# Patient Record
Sex: MALE | Race: WHITE | NOT HISPANIC OR LATINO | Employment: FULL TIME | URBAN - METROPOLITAN AREA
[De-identification: names, ages, dates, MRNs, and addresses within clinical notes are randomized per-mention and may not be internally consistent; named-entity substitution may affect disease eponyms.]

---

## 2017-03-30 PROBLEM — E86.0 DEHYDRATION: Status: RESOLVED | Noted: 2017-03-30 | Resolved: 2017-03-30

## 2017-03-30 PROBLEM — E86.0 DEHYDRATION: Status: ACTIVE | Noted: 2017-03-30

## 2021-10-28 ENCOUNTER — TELEPHONE (OUTPATIENT)
Dept: PSYCHIATRY | Facility: CLINIC | Age: 26
End: 2021-10-28
Payer: COMMERCIAL

## 2021-10-28 PROBLEM — F98.8 ATTENTION DEFICIT DISORDER (ADD) WITHOUT HYPERACTIVITY: Status: ACTIVE | Noted: 2021-10-28

## 2021-11-16 ENCOUNTER — OFFICE VISIT (OUTPATIENT)
Dept: PSYCHIATRY | Facility: CLINIC | Age: 26
End: 2021-11-16
Payer: COMMERCIAL

## 2021-11-16 VITALS
HEIGHT: 69 IN | HEART RATE: 75 BPM | SYSTOLIC BLOOD PRESSURE: 130 MMHG | DIASTOLIC BLOOD PRESSURE: 83 MMHG | BODY MASS INDEX: 21.27 KG/M2 | WEIGHT: 143.63 LBS

## 2021-11-16 DIAGNOSIS — F90.0 ATTENTION DEFICIT HYPERACTIVITY DISORDER (ADHD), PREDOMINANTLY INATTENTIVE TYPE: ICD-10-CM

## 2021-11-16 PROCEDURE — 1159F MED LIST DOCD IN RCRD: CPT | Mod: CPTII,S$GLB,,

## 2021-11-16 PROCEDURE — 3008F PR BODY MASS INDEX (BMI) DOCUMENTED: ICD-10-PCS | Mod: CPTII,S$GLB,,

## 2021-11-16 PROCEDURE — 1160F RVW MEDS BY RX/DR IN RCRD: CPT | Mod: CPTII,S$GLB,,

## 2021-11-16 PROCEDURE — 3008F BODY MASS INDEX DOCD: CPT | Mod: CPTII,S$GLB,,

## 2021-11-16 PROCEDURE — 90792 PR PSYCHIATRIC DIAGNOSTIC EVALUATION W/MEDICAL SERVICES: ICD-10-PCS | Mod: S$GLB,,,

## 2021-11-16 PROCEDURE — 99999 PR PBB SHADOW E&M-EST. PATIENT-LVL III: ICD-10-PCS | Mod: PBBFAC,,,

## 2021-11-16 PROCEDURE — 3079F PR MOST RECENT DIASTOLIC BLOOD PRESSURE 80-89 MM HG: ICD-10-PCS | Mod: CPTII,S$GLB,,

## 2021-11-16 PROCEDURE — 1160F PR REVIEW ALL MEDS BY PRESCRIBER/CLIN PHARMACIST DOCUMENTED: ICD-10-PCS | Mod: CPTII,S$GLB,,

## 2021-11-16 PROCEDURE — 3079F DIAST BP 80-89 MM HG: CPT | Mod: CPTII,S$GLB,,

## 2021-11-16 PROCEDURE — 3075F PR MOST RECENT SYSTOLIC BLOOD PRESS GE 130-139MM HG: ICD-10-PCS | Mod: CPTII,S$GLB,,

## 2021-11-16 PROCEDURE — 3075F SYST BP GE 130 - 139MM HG: CPT | Mod: CPTII,S$GLB,,

## 2021-11-16 PROCEDURE — 90792 PSYCH DIAG EVAL W/MED SRVCS: CPT | Mod: S$GLB,,,

## 2021-11-16 PROCEDURE — 99999 PR PBB SHADOW E&M-EST. PATIENT-LVL III: CPT | Mod: PBBFAC,,,

## 2021-11-16 PROCEDURE — 1159F PR MEDICATION LIST DOCUMENTED IN MEDICAL RECORD: ICD-10-PCS | Mod: CPTII,S$GLB,,

## 2021-11-16 RX ORDER — DEXTROAMPHETAMINE SULFATE, DEXTROAMPHETAMINE SACCHARATE, AMPHETAMINE SULFATE AND AMPHETAMINE ASPARTATE 5; 5; 5; 5 MG/1; MG/1; MG/1; MG/1
20 CAPSULE, EXTENDED RELEASE ORAL DAILY
Qty: 30 CAPSULE | Refills: 0 | Status: SHIPPED | OUTPATIENT
Start: 2021-11-16 | End: 2021-12-16 | Stop reason: SDUPTHER

## 2021-12-16 ENCOUNTER — OFFICE VISIT (OUTPATIENT)
Dept: PSYCHIATRY | Facility: CLINIC | Age: 26
End: 2021-12-16
Payer: COMMERCIAL

## 2021-12-16 VITALS
HEIGHT: 69 IN | WEIGHT: 137.25 LBS | DIASTOLIC BLOOD PRESSURE: 72 MMHG | HEART RATE: 66 BPM | SYSTOLIC BLOOD PRESSURE: 129 MMHG | BODY MASS INDEX: 20.33 KG/M2

## 2021-12-16 DIAGNOSIS — F90.0 ATTENTION DEFICIT HYPERACTIVITY DISORDER (ADHD), PREDOMINANTLY INATTENTIVE TYPE: Primary | ICD-10-CM

## 2021-12-16 PROCEDURE — 99999 PR PBB SHADOW E&M-EST. PATIENT-LVL III: ICD-10-PCS | Mod: PBBFAC,,,

## 2021-12-16 PROCEDURE — 90833 PR PSYCHOTHERAPY W/PATIENT W/E&M, 30 MIN (ADD ON): ICD-10-PCS | Mod: S$GLB,,,

## 2021-12-16 PROCEDURE — 90833 PSYTX W PT W E/M 30 MIN: CPT | Mod: S$GLB,,,

## 2021-12-16 PROCEDURE — 99213 OFFICE O/P EST LOW 20 MIN: CPT | Mod: S$GLB,,,

## 2021-12-16 PROCEDURE — 99999 PR PBB SHADOW E&M-EST. PATIENT-LVL III: CPT | Mod: PBBFAC,,,

## 2021-12-16 PROCEDURE — 99213 PR OFFICE/OUTPT VISIT, EST, LEVL III, 20-29 MIN: ICD-10-PCS | Mod: S$GLB,,,

## 2021-12-16 RX ORDER — DEXTROAMPHETAMINE SACCHARATE, AMPHETAMINE ASPARTATE MONOHYDRATE, DEXTROAMPHETAMINE SULFATE AND AMPHETAMINE SULFATE 5; 5; 5; 5 MG/1; MG/1; MG/1; MG/1
20 CAPSULE, EXTENDED RELEASE ORAL EVERY MORNING
Qty: 30 CAPSULE | Refills: 0 | Status: SHIPPED | OUTPATIENT
Start: 2022-01-16 | End: 2022-02-15

## 2021-12-16 RX ORDER — DEXTROAMPHETAMINE SULFATE, DEXTROAMPHETAMINE SACCHARATE, AMPHETAMINE SULFATE AND AMPHETAMINE ASPARTATE 5; 5; 5; 5 MG/1; MG/1; MG/1; MG/1
20 CAPSULE, EXTENDED RELEASE ORAL DAILY
Qty: 30 CAPSULE | Refills: 0 | Status: SHIPPED | OUTPATIENT
Start: 2021-12-16 | End: 2022-01-15

## 2021-12-16 RX ORDER — DEXTROAMPHETAMINE SACCHARATE, AMPHETAMINE ASPARTATE MONOHYDRATE, DEXTROAMPHETAMINE SULFATE AND AMPHETAMINE SULFATE 5; 5; 5; 5 MG/1; MG/1; MG/1; MG/1
20 CAPSULE, EXTENDED RELEASE ORAL EVERY MORNING
Qty: 30 CAPSULE | Refills: 0 | Status: SHIPPED | OUTPATIENT
Start: 2022-02-16 | End: 2022-03-14 | Stop reason: SDUPTHER

## 2022-01-18 ENCOUNTER — PATIENT MESSAGE (OUTPATIENT)
Dept: PSYCHIATRY | Facility: CLINIC | Age: 27
End: 2022-01-18
Payer: COMMERCIAL

## 2022-03-14 ENCOUNTER — OFFICE VISIT (OUTPATIENT)
Dept: PSYCHIATRY | Facility: CLINIC | Age: 27
End: 2022-03-14
Payer: COMMERCIAL

## 2022-03-14 VITALS
BODY MASS INDEX: 20.36 KG/M2 | HEART RATE: 78 BPM | HEIGHT: 69 IN | DIASTOLIC BLOOD PRESSURE: 97 MMHG | WEIGHT: 137.44 LBS | SYSTOLIC BLOOD PRESSURE: 155 MMHG

## 2022-03-14 DIAGNOSIS — F90.0 ATTENTION DEFICIT HYPERACTIVITY DISORDER (ADHD), PREDOMINANTLY INATTENTIVE TYPE: Primary | ICD-10-CM

## 2022-03-14 PROCEDURE — 1159F PR MEDICATION LIST DOCUMENTED IN MEDICAL RECORD: ICD-10-PCS | Mod: CPTII,S$GLB,,

## 2022-03-14 PROCEDURE — 99999 PR PBB SHADOW E&M-EST. PATIENT-LVL III: CPT | Mod: PBBFAC,,,

## 2022-03-14 PROCEDURE — 3008F BODY MASS INDEX DOCD: CPT | Mod: CPTII,S$GLB,,

## 2022-03-14 PROCEDURE — 90833 PR PSYCHOTHERAPY W/PATIENT W/E&M, 30 MIN (ADD ON): ICD-10-PCS | Mod: S$GLB,,,

## 2022-03-14 PROCEDURE — 3008F PR BODY MASS INDEX (BMI) DOCUMENTED: ICD-10-PCS | Mod: CPTII,S$GLB,,

## 2022-03-14 PROCEDURE — 1160F RVW MEDS BY RX/DR IN RCRD: CPT | Mod: CPTII,S$GLB,,

## 2022-03-14 PROCEDURE — 3080F DIAST BP >= 90 MM HG: CPT | Mod: CPTII,S$GLB,,

## 2022-03-14 PROCEDURE — 99214 PR OFFICE/OUTPT VISIT, EST, LEVL IV, 30-39 MIN: ICD-10-PCS | Mod: S$GLB,,,

## 2022-03-14 PROCEDURE — 1160F PR REVIEW ALL MEDS BY PRESCRIBER/CLIN PHARMACIST DOCUMENTED: ICD-10-PCS | Mod: CPTII,S$GLB,,

## 2022-03-14 PROCEDURE — 3077F PR MOST RECENT SYSTOLIC BLOOD PRESSURE >= 140 MM HG: ICD-10-PCS | Mod: CPTII,S$GLB,,

## 2022-03-14 PROCEDURE — 99999 PR PBB SHADOW E&M-EST. PATIENT-LVL III: ICD-10-PCS | Mod: PBBFAC,,,

## 2022-03-14 PROCEDURE — 1159F MED LIST DOCD IN RCRD: CPT | Mod: CPTII,S$GLB,,

## 2022-03-14 PROCEDURE — 3080F PR MOST RECENT DIASTOLIC BLOOD PRESSURE >= 90 MM HG: ICD-10-PCS | Mod: CPTII,S$GLB,,

## 2022-03-14 PROCEDURE — 3077F SYST BP >= 140 MM HG: CPT | Mod: CPTII,S$GLB,,

## 2022-03-14 PROCEDURE — 90833 PSYTX W PT W E/M 30 MIN: CPT | Mod: S$GLB,,,

## 2022-03-14 PROCEDURE — 99214 OFFICE O/P EST MOD 30 MIN: CPT | Mod: S$GLB,,,

## 2022-03-14 RX ORDER — DEXTROAMPHETAMINE SACCHARATE, AMPHETAMINE ASPARTATE MONOHYDRATE, DEXTROAMPHETAMINE SULFATE AND AMPHETAMINE SULFATE 5; 5; 5; 5 MG/1; MG/1; MG/1; MG/1
20 CAPSULE, EXTENDED RELEASE ORAL EVERY MORNING
Qty: 30 CAPSULE | Refills: 0 | Status: SHIPPED | OUTPATIENT
Start: 2022-03-14 | End: 2022-04-14 | Stop reason: SDUPTHER

## 2022-03-14 NOTE — PROGRESS NOTES
Outpatient Psychiatry Follow-Up Visit (MD/NP)    3/14/2022      Clinical Status of Patient:  Outpatient (Ambulatory)    Chief Complaint:  Ion Garvey is a 27 y.o. male who presents today for follow-up of attention problems.  Met with patient.      Interval History and Content of Current Session:  Interim Events/Subjective Report/Content of Current Session:     Pt is a 27 y.o. male with past history of ADHD and depression who presents for follow up treatment. Pt established care with me on 11/16/21, where Pt met criteria for ADHD. Pt is currently taking Adderall XR 20 mg po q.a.m.  Pt reports past trials of   Med tolerated well and provides good relief of symptoms overall.     Today patient reports his symptoms continue to be controlled on current dose of Adderall.  He has continued improved ability to function at work.  However he does report an increase in psychosocial stressors recently including changes at his workplace resulting in the patient having more responsibility and less control over his schedule, working 60-65 hours per week, and starting work on a musical album.  He notes intermittent worsening in his ability to concentrate however he believes this is related to increased stressors.  He denies depressed mood or anxiety.    He does report decreased appetite, stating he sometimes forgets to eat lunch.  He states he has been more insistent on taking a daily lunch break at work recently.  His weight has not decreased today from last appointment 3 months ago. Pt denies cardiovascular events including increased heart rate or blood pressure, palpitations, chest pain, dizziness, lightheadedness, or syncopal episodes. Pt denies A/V hallucinations or behavioral effects. Pt denies anorexia, xerostomia, headache, insomnia, or digital changes.      Depressive Disorder: DENIES all.   Anxiety Disorder: none.   Manic Disorder: DENIES all.   Psychotic Disorder: DENIES all.   Substance Use:  DENIES all.  "    Initial HPI: Pt. is a 26 y.o. male, with a past psychiatric hx of ADHD presenting to the clinic for an initial evaluation and treatment. PMHx outlined below. 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 He reports that along with medication, he was provided psychoeducation and tools to cope with his symptoms. He was prescribed Adderall XR 20 mg q.a.m. which he reports helped to increase his ability to focus, organize, and follow through on tasks. The [employer] company is no longer able to provide services across state lines, however, leading the Pt to establish care today. He reports frequent careless mistakes, difficulty sustaining attention, difficulty listening when others are speaking, difficulty following through, difficulty organizing tasks and activities, frequently losing things, distractibility, forgetfulness, restlessness, difficulty waiting turn, and frequent interruption of others.     He denies depressed mood or anxiety. He reports typically he has trouble falling asleep and multiple awakenings throughout the night, however, sleep has been better since he has been exercising over the last month. Reports he is easily fatigued. Appetite has also been better since working out over the last month since.    Initial Psych ROS:  Depression: denies depressed mood, anhedonia, appetite/weight changes, insomnia/hypersomnia, fatigue, feelings of worthlessness, hopelessness, or guilt, difficulty concentrating, or recurrent thoughts of death or SI  Isaura: denies episodes of expansive mood, decreased need for sleep, increased goal directed " "behaviors, or racing thoughts  Anxiety: denies panic attacks, agoraphobia, social anxiety, phobias, excessive worry, avoidance, or somatic related complaints   OCD: denies obsessions or compulsive behaviors  PTSD: denies flashbacks, nightmares, or avoidance of stimuli  Psychosis: denies A/V hallucinations or delusions   SI/HI - denies suicidal ideation, plan, or thoughts of harm to self or others  Access to guns: owns a rifle that is kept locked    Past Psychiatric hx:   First psych contact: in 2016 for anxiety and depression, [facility]    Prior hospitalizations: denies  Prior suicide attempts or self-harm: denies  Prior diagnosis: ADHD, anxiety, and depression  Prior meds: bupropion, hydroxyzine, adderall XR 20 mg  Current meds: none, telemedicine company closed, has been off of Adderall for 2 months  Prior psychotherapy: denies    Past Medical hx:   Past Medical History:   Diagnosis Date    Depression    Hx of TBI: denies  Hx of seizures: denies    Past Surgical hx:   Past Surgical History:   Procedure Laterality Date    EYE SURGERY      TONSILLECTOMY       Family Hx:   Paternal: dad - PTSD d/t service, "[employer]";  Men on father's side of family prone to alcohol use disorder  Maternal: no psychiatric history or history of substance abuse or suicide  Siblings: brother - OCD anxiety      Social Hx:   Childhood: born in [facility]; dad left when Pt was 3, mom  again, moved frequently d/t [employer]; in [place] since sophomore year of high school  Marital Status: never   Children: none  Resides: [place]  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     Medication changes on 11/16/21:  · Start Adderall XR 20 mg po q.a.m.     Anxiety: denies  Depression: denies     Denies suicidal/homicidal ideations.  Denies hopelessness/worthlessness.    Denies auditory/visual hallucinations      Alcohol/Drugs/Caffeine/Tobacco: No change in consumption      Review of Systems   · PSYCHIATRIC: Pertinant items are noted in the narrative.  · All other systems reviewed and found to be negative       Past Medical, Family and Social History: The patient's past medical, family and social history have been reviewed and updated as appropriate within the electronic medical record - see encounter notes.    Medications: Adderall XR 20 mg po q.a.m.    Compliance: yes    Side effects: appetite loss    Risk Parameters:  Patient reports no suicidal ideation  Patient reports no homicidal ideation  Patient reports no self-injurious behavior  Patient reports no violent behavior    Exam   Constitutional  Vitals:  Most recent vital signs, dated less than 90 days prior to this appointment, were reviewed.   Last 3 sets of Vitals    Vitals - 1 value per visit 12/16/2021 3/14/2022 3/14/2022   SYSTOLIC 129 - 155   DIASTOLIC 72 - 97   Pulse 66 - 78   Temp - - -   Resp - - -   SPO2 - - -   Weight (lb) 137.24 - 137.46   Weight (kg) 62.25 - 62.35   Height 69 - 69   BMI (Calculated) 20.3 - 20.3   VISIT REPORT - - -   Pain Score  - 0 -          General:  unremarkable, age appropriate     Musculoskeletal  Muscle Strength/Tone:  no rigidity, no flaccidity, no dystonia, no tic   Gait & Station:  non-ataxic     Psychiatric  Speech:  no latency; no press   Mood & Affect:  euthymic  congruent and appropriate   Thought Process:  normal and logical   Associations:  intact   Thought Content:  normal, no suicidality, no homicidality, delusions, or paranoia   Insight:  intact   Judgement: behavior is adequate to circumstances   Orientation:  grossly intact   Memory: intact  for content of interview   Language: grossly intact   Attention Span & Concentration:  able to focus   Fund of Knowledge:  intact and appropriate to age and level of education     Suicide Risk Assessment:  Protective factors: age, gender, no prior attempts, no prior hospitalizations, no ongoing substance abuse, no psychosis, denies SI/intent/plan, seeking treatment, access to treatment, future oriented, good primary support  Risks: hx anxiety and depression, gender, access to firearms  Patient is a low immediate and long-term risk considering risk factors. Patient denied suicidal or homicidal ideation, plan, or intent.  Patient noted agreement to call 911 and/or present to the nearest emergency department if Pt develops suicidal or homicidal ideation, plan, or intent.      Assessment and Diagnosis   Status/Progress: Based on the examination today, the patient's problem(s) is/are well controlled.  New problems have not been presented today.   Co-morbidities are not complicating management of the primary condition.  There are no active rule-out diagnoses for this patient at this time.     General Impression: Pt is a 27 y.o. male with past history of ADHD. Symptoms continue to be controlled on Adderall XR 20 mg po q.a.m. however the patient reports some intermittent difficulty with concentration and attention which may be related to psychosocial stressors.  Denies chest pain, palpitations, shortness of breath, syncope, dizziness, A/V hallucinations, diaphoresis. Pt does report decreased appetite, however he states this SE is tolerable and he would like to continue the medication at the current dose. Again discussed R/B/SE of stimulants including but not limited to cardiovascular events including increased heart rate and blood pressure, acute myocardial infarction, new-onset psychosis or rasheeda, AH/VH, serotonin syndrome, H/A, insomnia, xerostomia, and anorexia. Pt verbalized understanding.with Pt who verbalized  understanding and states he wishes to continue treatment.    This patient's profile was checked on the [place] Prescription Monitoring Program. No evidence of misuse.    Safe for outpatient follow up and no acute safety concerns.    Encounter Diagnosis   Name Primary?    Attention deficit hyperactivity disorder (ADHD), predominantly inattentive type Yes         Intervention/Counseling/Treatment Plan   · Medication Management: The risks and benefits of medication were discussed with the patient. Shared decision making occurred   · The treatment plan and follow up plan were reviewed with the patient.   · Continue Adderall XR 20 mg po q.a.m.   · Offered referral for individual psychotherapy.  · Counseled on regular exercise, maintenance of a healthy weight, balanced diet rich in fruits/vegetables and lean protein, and avoidance of unhealthy habits like smoking and excessive alcohol intake.  · Call to report any worsening of symptoms or problems with the medication. Pt instructed to go to ER with thoughts of harming self, others  · Labs: no new orders    Attention deficit hyperactivity disorder (ADHD), predominantly inattentive type  -     dextroamphetamine-amphetamine (ADDERALL XR) 20 MG 24 hr capsule; Take 1 capsule (20 mg total) by mouth every morning.  Dispense: 30 capsule; Refill: 0        Psychotherapy:    Target symptoms: distractability, lack of focus   Outcome monitoring methods: self-report, observation, feedback from family   Therapeutic Intervention Type: supportive psychotherapy   Why chosen therapy is appropriate versus another modality: relevant to diagnosis, patient responds to this modality, evidence based practice   Patient's response to intervention:The patient's response to intervention is accepting.   Progress toward goals: The patient's progress toward goals is good.   Topics discussed/themes: building skills sets for symptom management, symptom recognition, nutrition,  exercise   Duration of intervention: 16 minutes    Return to Clinic: 1 month, as needed    -Pt given contact number for psychotherapists at [facility] and also instructed they may check with their health insurance provider for a list of providers  -Spent 30 minutes face to face with the Pt; >50% time spent in counseling   -Questions were sought and answered to the Pt's stated verbal satisfaction.  -Supportive therapy and psychoeducation provided.  -Risks, benefits, and side effects of medications discussed with the Pt who expresses understanding and chooses to take medications as prescribed.   -Pt instructed to call clinic, 911, or go to nearest emergency room if symptoms worsen or pt is in crisis. The Pt expresses understanding.    DISCLAIMER: This note was prepared with [employer] Direct voice recognition transcription software. Garbled syntax, mangled pronouns, and other bizarre constructions may be attributed to that software system     HOLLY Bledsoe, PMHNP-BC  Department of Psychiatry - [facility] [address]  [place] [address]  Office: [phone]  Fax: [phone]

## 2022-04-13 ENCOUNTER — PATIENT MESSAGE (OUTPATIENT)
Dept: PSYCHIATRY | Facility: CLINIC | Age: 27
End: 2022-04-13
Payer: COMMERCIAL

## 2022-04-14 ENCOUNTER — OFFICE VISIT (OUTPATIENT)
Dept: PSYCHIATRY | Facility: CLINIC | Age: 27
End: 2022-04-14
Payer: COMMERCIAL

## 2022-04-14 VITALS
SYSTOLIC BLOOD PRESSURE: 133 MMHG | WEIGHT: 134.38 LBS | HEART RATE: 62 BPM | BODY MASS INDEX: 19.9 KG/M2 | HEIGHT: 69 IN | DIASTOLIC BLOOD PRESSURE: 84 MMHG

## 2022-04-14 DIAGNOSIS — F90.0 ATTENTION DEFICIT HYPERACTIVITY DISORDER (ADHD), PREDOMINANTLY INATTENTIVE TYPE: Primary | ICD-10-CM

## 2022-04-14 PROCEDURE — 99214 OFFICE O/P EST MOD 30 MIN: CPT | Mod: S$GLB,,,

## 2022-04-14 PROCEDURE — 3075F SYST BP GE 130 - 139MM HG: CPT | Mod: CPTII,S$GLB,,

## 2022-04-14 PROCEDURE — 90833 PR PSYCHOTHERAPY W/PATIENT W/E&M, 30 MIN (ADD ON): ICD-10-PCS | Mod: S$GLB,,,

## 2022-04-14 PROCEDURE — 3075F PR MOST RECENT SYSTOLIC BLOOD PRESS GE 130-139MM HG: ICD-10-PCS | Mod: CPTII,S$GLB,,

## 2022-04-14 PROCEDURE — 1159F PR MEDICATION LIST DOCUMENTED IN MEDICAL RECORD: ICD-10-PCS | Mod: CPTII,S$GLB,,

## 2022-04-14 PROCEDURE — 3079F PR MOST RECENT DIASTOLIC BLOOD PRESSURE 80-89 MM HG: ICD-10-PCS | Mod: CPTII,S$GLB,,

## 2022-04-14 PROCEDURE — 90833 PSYTX W PT W E/M 30 MIN: CPT | Mod: S$GLB,,,

## 2022-04-14 PROCEDURE — 99999 PR PBB SHADOW E&M-EST. PATIENT-LVL III: ICD-10-PCS | Mod: PBBFAC,,,

## 2022-04-14 PROCEDURE — 3008F PR BODY MASS INDEX (BMI) DOCUMENTED: ICD-10-PCS | Mod: CPTII,S$GLB,,

## 2022-04-14 PROCEDURE — 99999 PR PBB SHADOW E&M-EST. PATIENT-LVL III: CPT | Mod: PBBFAC,,,

## 2022-04-14 PROCEDURE — 3008F BODY MASS INDEX DOCD: CPT | Mod: CPTII,S$GLB,,

## 2022-04-14 PROCEDURE — 1160F PR REVIEW ALL MEDS BY PRESCRIBER/CLIN PHARMACIST DOCUMENTED: ICD-10-PCS | Mod: CPTII,S$GLB,,

## 2022-04-14 PROCEDURE — 99214 PR OFFICE/OUTPT VISIT, EST, LEVL IV, 30-39 MIN: ICD-10-PCS | Mod: S$GLB,,,

## 2022-04-14 PROCEDURE — 1160F RVW MEDS BY RX/DR IN RCRD: CPT | Mod: CPTII,S$GLB,,

## 2022-04-14 PROCEDURE — 1159F MED LIST DOCD IN RCRD: CPT | Mod: CPTII,S$GLB,,

## 2022-04-14 PROCEDURE — 3079F DIAST BP 80-89 MM HG: CPT | Mod: CPTII,S$GLB,,

## 2022-04-14 RX ORDER — DEXTROAMPHETAMINE SACCHARATE, AMPHETAMINE ASPARTATE MONOHYDRATE, DEXTROAMPHETAMINE SULFATE AND AMPHETAMINE SULFATE 5; 5; 5; 5 MG/1; MG/1; MG/1; MG/1
40 CAPSULE, EXTENDED RELEASE ORAL EVERY MORNING
Qty: 60 CAPSULE | Refills: 0 | Status: SHIPPED | OUTPATIENT
Start: 2022-05-14 | End: 2022-06-14 | Stop reason: SDUPTHER

## 2022-04-14 RX ORDER — DEXTROAMPHETAMINE SACCHARATE, AMPHETAMINE ASPARTATE MONOHYDRATE, DEXTROAMPHETAMINE SULFATE AND AMPHETAMINE SULFATE 5; 5; 5; 5 MG/1; MG/1; MG/1; MG/1
40 CAPSULE, EXTENDED RELEASE ORAL EVERY MORNING
Qty: 60 CAPSULE | Refills: 0 | Status: SHIPPED | OUTPATIENT
Start: 2022-04-14 | End: 2022-05-14

## 2022-04-14 NOTE — PROGRESS NOTES
Outpatient Psychiatry Follow-Up Visit (MD/NP)    4/14/2022      Clinical Status of Patient:  Outpatient (Ambulatory)    Chief Complaint:  Ion Garvey is a 27 y.o. male who presents today for follow-up of attention problems.  Met with patient.      Interval History and Content of Current Session:  Interim Events/Subjective Report/Content of Current Session:     Pt is a 27 y.o. male with past history of ADHD and depression who presents for follow up treatment. Pt established care with me on 11/16/21, where Pt met criteria for ADHD. Pt is currently taking Adderall XR 20 mg po q.a.m.  Pt reports past trials of   Med tolerated well and provides good relief of symptoms overall.     Today patient reports his symptoms are nit well controlled on current dose of Adderall.  He reports he has been more distracted and has been leaving things unfinished.  However he does report an increase in psychosocial stressors recently including changes at his workplace resulting in the patient having more responsibility and less control over his schedule, working 60-65 hours per week, and starting work on a musical album.  He does report he took 2 Adderall 20 mg for a total of 40 mg and felt that this does controlled his symptoms allow him to focus and complete projects at work.  He denies any increase in side effects with the 40 mg dose.  He denies depressed mood or anxiety.    He does continue to report decreased appetite but states that he has been taking lunch break every day at 11:00 a.m.  His weight has not decreased today from last appointment. Pt denies cardiovascular events including increased heart rate or blood pressure, palpitations, chest pain, dizziness, lightheadedness, or syncopal episodes. Pt denies A/V hallucinations or behavioral effects. Pt denies anorexia, xerostomia, headache, insomnia, or digital changes.      Depressive Disorder: DENIES all.   Anxiety Disorder: none.   Manic Disorder: DENIES all.   Psychotic  "Disorder: DENIES all.   Substance Use:  DENIES all.     Initial HPI: Pt. is a 26 y.o. male, with a past psychiatric hx of ADHD presenting to the clinic for an initial evaluation and treatment. PMHx outlined below. 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 He reports that along with medication, he was provided psychoeducation and tools to cope with his symptoms. He was prescribed Adderall XR 20 mg q.a.m. which he reports helped to increase his ability to focus, organize, and follow through on tasks. The telemedicine company is no longer able to provide services across state lines, however, leading the Pt to establish care today. He reports frequent careless mistakes, difficulty sustaining attention, difficulty listening when others are speaking, difficulty following through, difficulty organizing tasks and activities, frequently losing things, distractibility, forgetfulness, restlessness, difficulty waiting turn, and frequent interruption of others.     He denies depressed mood or anxiety. He reports typically he has trouble falling asleep and multiple awakenings throughout the night, however, sleep has been better since he has been exercising over the last month. Reports he is easily fatigued. Appetite has also been better since working out over the last month since.    Initial Psych ROS:  Depression: denies depressed mood, anhedonia, appetite/weight changes, insomnia/hypersomnia, fatigue, feelings of worthlessness, hopelessness, or guilt, difficulty concentrating, or recurrent thoughts of death or SI  Isaura: denies episodes of expansive mood, " "decreased need for sleep, increased goal directed behaviors, or racing thoughts  Anxiety: denies panic attacks, agoraphobia, social anxiety, phobias, excessive worry, avoidance, or somatic related complaints   OCD: denies obsessions or compulsive behaviors  PTSD: denies flashbacks, nightmares, or avoidance of stimuli  Psychosis: denies A/V hallucinations or delusions   SI/HI - denies suicidal ideation, plan, or thoughts of harm to self or others  Access to guns: owns a rifle that is kept locked    Past Psychiatric hx:   First psych contact: in 2016 for anxiety and depression, [facility]    Prior hospitalizations: denies  Prior suicide attempts or self-harm: denies  Prior diagnosis: ADHD, anxiety, and depression  Prior meds: bupropion, hydroxyzine, adderall XR 20 mg  Current meds: none, telemedicine company closed, has been off of Adderall for 2 months  Prior psychotherapy: denies    Past Medical hx:   Past Medical History:   Diagnosis Date    Depression    Hx of TBI: denies  Hx of seizures: denies    Past Surgical hx:   Past Surgical History:   Procedure Laterality Date    EYE SURGERY      TONSILLECTOMY       Family Hx:   Paternal: dad - PTSD d/t service, "herm[employer]";  Men on father's side of family prone to alcohol use disorder  Maternal: no psychiatric history or history of substance abuse or suicide  Siblings: brother - OCD anxiety      Social Hx:   Childhood: born in [facility]; dad left when Pt was 3, mom  again, moved frequently d/t ; in [place] since sophomore year of high school  Marital Status: never   Children: none  Resides: [place]  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     Medication changes on 11/16/21:  · Start Adderall XR 20 mg po q.a.m.     Anxiety: denies  Depression: denies     Alcohol/Drugs/Caffeine/Tobacco: No change in consumption    Review of Systems   · PSYCHIATRIC: Pertinant items are noted in the narrative.  · All other systems reviewed and found to be negative       Past Medical, Family and Social History: The patient's past medical, family and social history have been reviewed and updated as appropriate within the electronic medical record - see encounter notes.    Medications: Adderall XR 20 mg po q.a.m.    Compliance: yes    Side effects: appetite loss    Risk Parameters:  Patient reports no suicidal ideation  Patient reports no homicidal ideation  Patient reports no self-injurious behavior  Patient reports no violent behavior    Exam   Constitutional  Vitals:  Most recent vital signs, dated less than 90 days prior to this appointment, were reviewed.   Last 3 sets of Vitals    Vitals - 1 value per visit 3/14/2022 4/14/2022 4/14/2022   SYSTOLIC 155 - 133   DIASTOLIC 97 - 84   Pulse 78 - 62   Temp - - -   Resp - - -   SPO2 - - -   Weight (lb) 137.46 - 134.37   Weight (kg) 62.35 - 60.95   Height 69 - 69   BMI (Calculated) 20.3 - 19.8   VISIT REPORT - - -   Pain Score  - 0 -          General:  unremarkable, age appropriate     Musculoskeletal  Muscle Strength/Tone:  no rigidity, no flaccidity, no dystonia, no tic   Gait & Station:  non-ataxic     Psychiatric  Speech:  no latency; no press   Mood & Affect:  euthymic  congruent and appropriate   Thought Process:  normal and logical   Associations:  intact   Thought Content:  normal, no suicidality, no homicidality, delusions, or paranoia   Insight:  intact   Judgement: behavior is adequate to circumstances   Orientation:  grossly intact   Memory: intact for content of interview   Language: grossly intact   Attention Span &  Concentration:  able to focus   Fund of Knowledge:  intact and appropriate to age and level of education     Suicide Risk Assessment:  Protective factors: age, gender, no prior attempts, no prior hospitalizations, no ongoing substance abuse, no psychosis, denies SI/intent/plan, seeking treatment, access to treatment, future oriented, good primary support  Risks: hx anxiety and depression, gender, access to firearms  Patient is a low immediate and long-term risk considering risk factors. Patient denied suicidal or homicidal ideation, plan, or intent.  Patient noted agreement to call 911 and/or present to the nearest emergency department if Pt develops suicidal or homicidal ideation, plan, or intent.      Assessment and Diagnosis   Status/Progress: Based on the examination today, the patient's problem(s) is/are well controlled.  New problems have not been presented today.   Co-morbidities are not complicating management of the primary condition.  There are no active rule-out diagnoses for this patient at this time.     General Impression: Pt is a 27 y.o. male with past history of ADHD.  Patient reports the 20 mg dose of Adderall is no longer controlling his symptoms of attention and concentration deficit.  He does note that he tried taking 40 mg of Adderall and reports this does provide a good control of his symptoms but did not increase side effects.     Denies chest pain, palpitations, shortness of breath, syncope, dizziness, A/V hallucinations, diaphoresis. Pt does report decreased appetite, however he states this SE is tolerable and he would like to continue the medication at the current dose. Again discussed R/B/SE of stimulants including but not limited to cardiovascular events including increased heart rate and blood pressure, acute myocardial infarction, new-onset psychosis or rasheeda, AH/VH, serotonin syndrome, H/A, insomnia, xerostomia, and anorexia. Pt verbalized understanding.with Pt who verbalized  understanding and states he wishes to continue treatment.    This patient's profile was checked on the [place] Prescription Monitoring Program. No evidence of misuse.    Safe for outpatient follow up and no acute safety concerns.    Encounter Diagnosis   Name Primary?    Attention deficit hyperactivity disorder (ADHD), predominantly inattentive type Yes         Intervention/Counseling/Treatment Plan   · Medication Management: The risks and benefits of medication were discussed with the patient. Shared decision making occurred   · The treatment plan and follow up plan were reviewed with the patient.   · Increase Adderall XR to 40 mg po q.a.m.   · Schedule a visit with primary care provider for elevated blood pressure  · Follow-up with Offered referral for individual psychotherapy.  · Counseled on regular exercise, maintenance of a healthy weight, balanced diet rich in fruits/vegetables and lean protein, and avoidance of unhealthy habits like smoking and excessive alcohol intake.  · Call to report any worsening of symptoms or problems with the medication. Pt instructed to go to ER with thoughts of harming self, others  · Labs: no new orders    Attention deficit hyperactivity disorder (ADHD), predominantly inattentive type  -     dextroamphetamine-amphetamine (ADDERALL XR) 20 MG 24 hr capsule; Take 2 capsules (40 mg total) by mouth every morning.  Dispense: 60 capsule; Refill: 0    Other orders  -     dextroamphetamine-amphetamine (ADDERALL XR) 20 MG 24 hr capsule; Take 2 capsules (40 mg total) by mouth every morning.  Dispense: 60 capsule; Refill: 0        Psychotherapy:    Target symptoms: distractability, lack of focus   Outcome monitoring methods: self-report, observation, feedback from family   Therapeutic Intervention Type: supportive psychotherapy   Why chosen therapy is appropriate versus another modality: relevant to diagnosis, patient responds to this modality, evidence based practice   Patient's  response to intervention:The patient's response to intervention is accepting.   Progress toward goals: The patient's progress toward goals is good.   Topics discussed/themes: building skills sets for symptom management, symptom recognition, nutrition, exercise   Duration of intervention: 16 minutes    Return to Clinic: 2 months, as needed    -Pt given contact number for psychotherapists at [facility] and also instructed they may check with their health insurance provider for a list of providers  -Spent 30 minutes face to face with the Pt; >50% time spent in counseling   -Questions were sought and answered to the Pt's stated verbal satisfaction.  -Supportive therapy and psychoeducation provided.  -Risks, benefits, and side effects of medications discussed with the Pt who expresses understanding and chooses to take medications as prescribed.   -Pt instructed to call clinic, 911, or go to nearest emergency room if symptoms worsen or pt is in crisis. The Pt expresses understanding.    DISCLAIMER: This note was prepared with [employer] Direct voice recognition transcription software. Garbled syntax, mangled pronouns, and other bizarre constructions may be attributed to that software system     HOLLY Bledsoe, PMHNP-BC  Department of Psychiatry - [facility]  [address]  [place] [address]  Office: [phone]  Fax: [phone]

## 2022-06-13 ENCOUNTER — PATIENT MESSAGE (OUTPATIENT)
Dept: PSYCHIATRY | Facility: CLINIC | Age: 27
End: 2022-06-13
Payer: COMMERCIAL

## 2022-06-14 ENCOUNTER — OFFICE VISIT (OUTPATIENT)
Dept: PSYCHIATRY | Facility: CLINIC | Age: 27
End: 2022-06-14
Payer: COMMERCIAL

## 2022-06-14 VITALS
RESPIRATION RATE: 20 BRPM | BODY MASS INDEX: 19.58 KG/M2 | HEART RATE: 71 BPM | SYSTOLIC BLOOD PRESSURE: 133 MMHG | DIASTOLIC BLOOD PRESSURE: 71 MMHG | WEIGHT: 132.63 LBS

## 2022-06-14 DIAGNOSIS — F90.0 ATTENTION DEFICIT HYPERACTIVITY DISORDER (ADHD), PREDOMINANTLY INATTENTIVE TYPE: Primary | ICD-10-CM

## 2022-06-14 PROCEDURE — 1159F PR MEDICATION LIST DOCUMENTED IN MEDICAL RECORD: ICD-10-PCS | Mod: CPTII,S$GLB,,

## 2022-06-14 PROCEDURE — 3008F PR BODY MASS INDEX (BMI) DOCUMENTED: ICD-10-PCS | Mod: CPTII,S$GLB,,

## 2022-06-14 PROCEDURE — 3078F DIAST BP <80 MM HG: CPT | Mod: CPTII,S$GLB,,

## 2022-06-14 PROCEDURE — 3078F PR MOST RECENT DIASTOLIC BLOOD PRESSURE < 80 MM HG: ICD-10-PCS | Mod: CPTII,S$GLB,,

## 2022-06-14 PROCEDURE — 3075F SYST BP GE 130 - 139MM HG: CPT | Mod: CPTII,S$GLB,,

## 2022-06-14 PROCEDURE — 3075F PR MOST RECENT SYSTOLIC BLOOD PRESS GE 130-139MM HG: ICD-10-PCS | Mod: CPTII,S$GLB,,

## 2022-06-14 PROCEDURE — 1160F PR REVIEW ALL MEDS BY PRESCRIBER/CLIN PHARMACIST DOCUMENTED: ICD-10-PCS | Mod: CPTII,S$GLB,,

## 2022-06-14 PROCEDURE — 99999 PR PBB SHADOW E&M-EST. PATIENT-LVL III: ICD-10-PCS | Mod: PBBFAC,,,

## 2022-06-14 PROCEDURE — 3008F BODY MASS INDEX DOCD: CPT | Mod: CPTII,S$GLB,,

## 2022-06-14 PROCEDURE — 99213 OFFICE O/P EST LOW 20 MIN: CPT | Mod: S$GLB,,,

## 2022-06-14 PROCEDURE — 99999 PR PBB SHADOW E&M-EST. PATIENT-LVL III: CPT | Mod: PBBFAC,,,

## 2022-06-14 PROCEDURE — 1160F RVW MEDS BY RX/DR IN RCRD: CPT | Mod: CPTII,S$GLB,,

## 2022-06-14 PROCEDURE — 1159F MED LIST DOCD IN RCRD: CPT | Mod: CPTII,S$GLB,,

## 2022-06-14 PROCEDURE — 99213 PR OFFICE/OUTPT VISIT, EST, LEVL III, 20-29 MIN: ICD-10-PCS | Mod: S$GLB,,,

## 2022-06-14 RX ORDER — DEXTROAMPHETAMINE SACCHARATE, AMPHETAMINE ASPARTATE MONOHYDRATE, DEXTROAMPHETAMINE SULFATE AND AMPHETAMINE SULFATE 5; 5; 5; 5 MG/1; MG/1; MG/1; MG/1
40 CAPSULE, EXTENDED RELEASE ORAL EVERY MORNING
Qty: 60 CAPSULE | Refills: 0 | Status: SHIPPED | OUTPATIENT
Start: 2022-07-14 | End: 2022-08-13

## 2022-06-14 RX ORDER — DEXTROAMPHETAMINE SACCHARATE, AMPHETAMINE ASPARTATE MONOHYDRATE, DEXTROAMPHETAMINE SULFATE AND AMPHETAMINE SULFATE 5; 5; 5; 5 MG/1; MG/1; MG/1; MG/1
40 CAPSULE, EXTENDED RELEASE ORAL EVERY MORNING
Qty: 60 CAPSULE | Refills: 0 | Status: SHIPPED | OUTPATIENT
Start: 2022-06-14 | End: 2022-07-14

## 2022-06-14 RX ORDER — DEXTROAMPHETAMINE SACCHARATE, AMPHETAMINE ASPARTATE MONOHYDRATE, DEXTROAMPHETAMINE SULFATE AND AMPHETAMINE SULFATE 5; 5; 5; 5 MG/1; MG/1; MG/1; MG/1
40 CAPSULE, EXTENDED RELEASE ORAL EVERY MORNING
Qty: 60 CAPSULE | Refills: 0 | Status: SHIPPED | OUTPATIENT
Start: 2022-08-14 | End: 2022-11-28 | Stop reason: SDUPTHER

## 2022-06-14 NOTE — PROGRESS NOTES
Outpatient Psychiatry Follow-Up Visit (MD/NP)    6/14/2022      Clinical Status of Patient:  Outpatient (Ambulatory)    Chief Complaint:  Ion Garvey is a 27 y.o. male who presents today for follow-up of attention problems.  Met with patient.      Interval History and Content of Current Session:  Interim Events/Subjective Report/Content of Current Session:     Pt is a 27 y.o. male with past history of ADHD and depression who presents for follow up treatment. Pt established care with me on 11/16/21, where Pt met criteria for ADHD. Pt is currently taking Adderall XR 40 mg po q.a.m.  Pt reports past trials of   Med tolerated well and provides good relief of symptoms overall.     In the interim, patient reports good control of symptoms of ADHD with increase in dose of Adderall XR.  He notes it has been helpful to be able to tailor his dose to the demands of the day.  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  He has reports he has made an appointment with his primary care provider to discuss his elevated blood pressure.    Patient reports decreased appetite continues to represent a problem and notes a weight loss of several lb recently.  He states he is going to speak with his supervisor about scheduling a daily lunch break so that he has time to eat.  He denies any other side effects of Adderall. Pt denies cardiovascular events including increased heart rate or blood pressure, palpitations, chest pain, dizziness, lightheadedness, or syncopal episodes. Pt denies A/V hallucinations or behavioral effects. Pt denies anorexia, xerostomia, headache, insomnia, or digital changes.      Depressive Disorder: DENIES all.   Anxiety Disorder: none.   Manic Disorder: DENIES all.   Psychotic Disorder:  "DENIES all.   Substance Use:  DENIES all.     Initial HPI: Pt. is a 26 y.o. male, with a past psychiatric hx of ADHD presenting to the clinic for an initial evaluation and treatment. PMHx outlined below. 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 He reports that along with medication, he was provided psychoeducation and tools to cope with his symptoms. He was prescribed Adderall XR 20 mg q.a.m. which he reports helped to increase his ability to focus, organize, and follow through on tasks. The telemedicine company is no longer able to provide services across state lines, however, leading the Pt to establish care today. He reports frequent careless mistakes, difficulty sustaining attention, difficulty listening when others are speaking, difficulty following through, difficulty organizing tasks and activities, frequently losing things, distractibility, forgetfulness, restlessness, difficulty waiting turn, and frequent interruption of others.     He denies depressed mood or anxiety. He reports typically he has trouble falling asleep and multiple awakenings throughout the night, however, sleep has been better since he has been exercising over the last month. Reports he is easily fatigued. Appetite has also been better since working out over the last month since.    Initial Psych ROS:  Depression: denies depressed mood, anhedonia, appetite/weight changes, insomnia/hypersomnia, fatigue, feelings of worthlessness, hopelessness, or guilt, difficulty concentrating, or recurrent thoughts of death or SI  Isaura: denies episodes of expansive mood, decreased need " "for sleep, increased goal directed behaviors, or racing thoughts  Anxiety: denies panic attacks, agoraphobia, social anxiety, phobias, excessive worry, avoidance, or somatic related complaints   OCD: denies obsessions or compulsive behaviors  PTSD: denies flashbacks, nightmares, or avoidance of stimuli  Psychosis: denies A/V hallucinations or delusions   SI/HI - denies suicidal ideation, plan, or thoughts of harm to self or others  Access to guns: owns a rifle that is kept locked    Past Psychiatric hx:   First psych contact: in 2016 for anxiety and depression, [facility]    Prior hospitalizations: denies  Prior suicide attempts or self-harm: denies  Prior diagnosis: ADHD, anxiety, and depression  Prior meds: bupropion, hydroxyzine, adderall XR 20 mg  Current meds: none, telemedicine company closed, has been off of Adderall for 2 months  Prior psychotherapy: denies    Past Medical hx:   Past Medical History:   Diagnosis Date    Depression    Hx of TBI: denies  Hx of seizures: denies    Past Surgical hx:   Past Surgical History:   Procedure Laterality Date    EYE SURGERY      TONSILLECTOMY       Family Hx:   Paternal: dad - PTSD d/t service, "hermTopio";  Men on father's side of family prone to alcohol use disorder  Maternal: no psychiatric history or history of substance abuse or suicide  Siblings: brother - OCD anxiety      Social Hx:   Childhood: born in [facility]; dad left when Pt was 3, mom  again, moved frequently d/t ; in [place] since sophomore year of high school  Marital Status: never   Children: none  Resides: [place]  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     3/14/22:  · Continue Adderall XR 20 mg po q.a.m.     Medication changes on 11/16/21:  · Start Adderall XR 20 mg po q.a.m.     Anxiety: denies  Depression: denies     Alcohol/Drugs/Caffeine/Tobacco: No change in consumption    Review of Systems   · PSYCHIATRIC: Pertinant items are noted in the narrative.  · All other systems reviewed and found to be negative       Past Medical, Family and Social History: The patient's past medical, family and social history have been reviewed and updated as appropriate within the electronic medical record - see encounter notes.    Medications: Adderall XR 40 mg po q.a.m.    Compliance: yes    Side effects: appetite loss    Risk Parameters:  Patient reports no suicidal ideation  Patient reports no homicidal ideation  Patient reports no self-injurious behavior  Patient reports no violent behavior    Exam   Constitutional  Vitals:  Most recent vital signs, dated less than 90 days prior to this appointment, were reviewed.   Last 3 sets of Vitals    Vitals - 1 value per visit 4/14/2022 6/14/2022 6/14/2022   SYSTOLIC 133 - 133   DIASTOLIC 84 - 71   Pulse 62 - 71   Temp - - -   Resp - - 20   SPO2 - - -   Weight (lb) 134.37 - 132.61   Weight (kg) 60.95 - 60.15   Height 69 - -   BMI (Calculated) 19.8 - -   VISIT REPORT - - -   Pain Score  - 0 -          General:  unremarkable, age appropriate     Musculoskeletal  Muscle Strength/Tone:  no rigidity, no flaccidity, no dystonia, no tic   Gait & Station:  non-ataxic     Psychiatric  Speech:  no latency; no press   Mood & Affect:  euthymic  congruent and appropriate   Thought Process:  normal and logical   Associations:  intact   Thought Content:  normal, no suicidality, no homicidality, delusions, or paranoia   Insight:  intact   Judgement: behavior is adequate to circumstances   Orientation:  grossly intact   Memory: intact for content of interview    Language: grossly intact   Attention Span & Concentration:  able to focus   Fund of Knowledge:  intact and appropriate to age and level of education     Suicide Risk Assessment:  Protective factors: age, gender, no prior attempts, no prior hospitalizations, no ongoing substance abuse, no psychosis, denies SI/intent/plan, seeking treatment, access to treatment, future oriented, good primary support  Risks: hx anxiety and depression, gender, access to firearms  Patient is a low immediate and long-term risk considering risk factors. Patient denied suicidal or homicidal ideation, plan, or intent.  Patient noted agreement to call 911 and/or present to the nearest emergency department if Pt develops suicidal or homicidal ideation, plan, or intent.      Assessment and Diagnosis   Status/Progress: Based on the examination today, the patient's problem(s) is/are well controlled.  New problems have not been presented today.   Co-morbidities are not complicating management of the primary condition.  There are no active rule-out diagnoses for this patient at this time.     General Impression: Pt is a 27 y.o. male with past history of ADHD.  Patient reports good control of symptoms of ADHD with Adderall XR 40 mg p.o. q.a.m..  He denies side effects.    Again discussed R/B/SE of stimulants including but not limited to cardiovascular events including increased heart rate and blood pressure, acute myocardial infarction, new-onset psychosis or rasheeda, AH/VH, serotonin syndrome, H/A, insomnia, xerostomia, and anorexia. Pt verbalized understanding.with Pt who verbalized understanding and states he wishes to continue treatment.    This patient's profile was checked on the [place] Prescription Monitoring Program. No evidence of misuse.    Safe for outpatient follow up and no acute safety concerns.    No diagnosis found.      Intervention/Counseling/Treatment Plan   · Medication Management: The risks and benefits of medication were  discussed with the patient. Shared decision making occurred   · The treatment plan and follow up plan were reviewed with the patient.   · Continue Adderall XR 40 mg po q.a.m.   · Schedule a visit with primary care provider for elevated blood pressure  · Offered referral for individual psychotherapy.  · Counseled on regular exercise, maintenance of a healthy weight, balanced diet rich in fruits/vegetables and lean protein, and avoidance of unhealthy habits like smoking and excessive alcohol intake.  · Call to report any worsening of symptoms or problems with the medication. Pt instructed to go to ER with thoughts of harming self, others  · Labs: no new orders    There are no diagnoses linked to this encounter.    Psychotherapy:    Target symptoms: distractability, lack of focus   Outcome monitoring methods: self-report, observation, feedback from family   Therapeutic Intervention Type: supportive psychotherapy   Why chosen therapy is appropriate versus another modality: relevant to diagnosis, patient responds to this modality, evidence based practice   Patient's response to intervention:The patient's response to intervention is accepting.   Progress toward goals: The patient's progress toward goals is good.   Topics discussed/themes: building skills sets for symptom management, symptom recognition, nutrition, exercise   Duration of intervention: 16 minutes    Return to Clinic: 3 months        Medication Management: The risks and benefits of stimulant medication were discussed.      Patient has no contraindications: no h/o allergic rxn, agitation, arrythmia, cardiovascular disease, cardiac structural abnormalities, hyperthyroidism, glaucoma, etc.    Completed cardiac screen prior to initiation.    Discussed stimulant side effects including effects on sleep, appetite, cardiac concerns, chest pain, psychosis, rasheeda, aggression, HTN, MI, stroke, arrythmia, seizure, anaphylaxis or other allergic reactions,  leukopenia, nervousness, anorexia, insomnia, tachycardia, palpitations, dizziness, BP changes, HR changes, visual disturbance, and headaches   Discussed medication being a controlled substance, to place medication in a secured place, and the risk of dependency. Discussed to never use this medication in combination with illicit drugs, alcohol, or sedatives.    Patient to sign consent for treatment with a controlled substance document,.   Discussed diagnosis, risks and benefits of proposed treatment vs alternative treatments vs no treatment, and potential side effects of these treatments (death, dependency, etc.). The patient expresses understanding of the above and displays the capacity to agree with this treatment given said understanding. Patient also agrees that, currently, the benefits outweigh the risks and would like to pursue treatment at this time.    -Educated patient about appropriate treatment options incl. stimulant medication, nonstimulant medication, and therapy.   -Educated patient about appropriate use of ADHD medications, common side effects of the medications (cardiac being most significant) and when to call about complications.   -Take any c/o chest pain seriously & seek immediate medical attention.        -Pt given contact number for psychotherapists at [facility] and also instructed they may check with their health insurance provider for a list of providers  -Spent 30 minutes face to face with the Pt; >50% time spent in counseling   -Questions were sought and answered to the Pt's stated verbal satisfaction.  -Supportive therapy and psychoeducation provided.  -Risks, benefits, and side effects of medications discussed with the Pt who expresses understanding and chooses to take medications as prescribed.   -Pt instructed to call clinic, 911, or go to nearest emergency room if symptoms worsen or pt is in crisis. The Pt expresses understanding.    DISCLAIMER: This note was prepared with [employer]  Modal [employer] Direct voice recognition transcription software. Garbled syntax, mangled pronouns, and other bizarre constructions may be attributed to that software system     HOLLY Bledsoe, PMHNP-BC  Department of Psychiatry - [facility] [address] [facility]  [place] [address]  Office: [phone]  Fax: [phone]

## 2022-11-09 RX ORDER — DEXTROAMPHETAMINE SACCHARATE, AMPHETAMINE ASPARTATE MONOHYDRATE, DEXTROAMPHETAMINE SULFATE AND AMPHETAMINE SULFATE 5; 5; 5; 5 MG/1; MG/1; MG/1; MG/1
40 CAPSULE, EXTENDED RELEASE ORAL EVERY MORNING
Qty: 60 CAPSULE | Refills: 0 | OUTPATIENT
Start: 2022-11-09 | End: 2022-12-09

## 2022-11-09 NOTE — TELEPHONE ENCOUNTER
PATIENT CALLED   Patient called and scheduled a follow up appointment.   Patient apologized for not scheduling a visit sooner that he had some financial issues and couldn't afford visits or medication at the time.     Patient is scheduled for 11/28/22 at 2pm virtually.   But is asking if there was anyway he can get a refill on his medication before then     He would like medication to be sent to the [facility] [address] [place], [place]

## 2022-11-11 RX ORDER — DEXTROAMPHETAMINE SACCHARATE, AMPHETAMINE ASPARTATE MONOHYDRATE, DEXTROAMPHETAMINE SULFATE AND AMPHETAMINE SULFATE 5; 5; 5; 5 MG/1; MG/1; MG/1; MG/1
40 CAPSULE, EXTENDED RELEASE ORAL EVERY MORNING
Qty: 60 CAPSULE | Refills: 0 | OUTPATIENT
Start: 2022-11-11 | End: 2022-12-11

## 2022-11-25 ENCOUNTER — PATIENT MESSAGE (OUTPATIENT)
Dept: PSYCHIATRY | Facility: CLINIC | Age: 27
End: 2022-11-25
Payer: COMMERCIAL

## 2022-11-28 ENCOUNTER — OFFICE VISIT (OUTPATIENT)
Dept: PSYCHIATRY | Facility: CLINIC | Age: 27
End: 2022-11-28
Payer: COMMERCIAL

## 2022-11-28 ENCOUNTER — PATIENT MESSAGE (OUTPATIENT)
Dept: PSYCHIATRY | Facility: CLINIC | Age: 27
End: 2022-11-28
Payer: COMMERCIAL

## 2022-11-28 DIAGNOSIS — F90.0 ATTENTION DEFICIT HYPERACTIVITY DISORDER (ADHD), PREDOMINANTLY INATTENTIVE TYPE: Primary | ICD-10-CM

## 2022-11-28 PROCEDURE — 99212 OFFICE O/P EST SF 10 MIN: CPT | Mod: PO

## 2022-11-28 PROCEDURE — 1159F PR MEDICATION LIST DOCUMENTED IN MEDICAL RECORD: ICD-10-PCS | Mod: CPTII,95,,

## 2022-11-28 PROCEDURE — 1159F MED LIST DOCD IN RCRD: CPT | Mod: CPTII,95,,

## 2022-11-28 PROCEDURE — 1160F RVW MEDS BY RX/DR IN RCRD: CPT | Mod: CPTII,95,,

## 2022-11-28 PROCEDURE — 99213 PR OFFICE/OUTPT VISIT, EST, LEVL III, 20-29 MIN: ICD-10-PCS | Mod: 95,,,

## 2022-11-28 PROCEDURE — 1160F PR REVIEW ALL MEDS BY PRESCRIBER/CLIN PHARMACIST DOCUMENTED: ICD-10-PCS | Mod: CPTII,95,,

## 2022-11-28 PROCEDURE — 99213 OFFICE O/P EST LOW 20 MIN: CPT | Mod: 95,,,

## 2022-11-28 RX ORDER — DEXTROAMPHETAMINE SACCHARATE, AMPHETAMINE ASPARTATE MONOHYDRATE, DEXTROAMPHETAMINE SULFATE AND AMPHETAMINE SULFATE 5; 5; 5; 5 MG/1; MG/1; MG/1; MG/1
40 CAPSULE, EXTENDED RELEASE ORAL EVERY MORNING
Qty: 60 CAPSULE | Refills: 0 | Status: SHIPPED | OUTPATIENT
Start: 2023-01-28 | End: 2023-03-21 | Stop reason: SDUPTHER

## 2022-11-28 RX ORDER — DEXTROAMPHETAMINE SACCHARATE, AMPHETAMINE ASPARTATE MONOHYDRATE, DEXTROAMPHETAMINE SULFATE AND AMPHETAMINE SULFATE 5; 5; 5; 5 MG/1; MG/1; MG/1; MG/1
40 CAPSULE, EXTENDED RELEASE ORAL EVERY MORNING
Qty: 60 CAPSULE | Refills: 0 | Status: SHIPPED | OUTPATIENT
Start: 2022-12-28 | End: 2023-01-27

## 2022-11-28 RX ORDER — DEXTROAMPHETAMINE SACCHARATE, AMPHETAMINE ASPARTATE MONOHYDRATE, DEXTROAMPHETAMINE SULFATE AND AMPHETAMINE SULFATE 5; 5; 5; 5 MG/1; MG/1; MG/1; MG/1
40 CAPSULE, EXTENDED RELEASE ORAL EVERY MORNING
Qty: 60 CAPSULE | Refills: 0 | Status: SHIPPED | OUTPATIENT
Start: 2022-11-28 | End: 2022-12-28

## 2022-11-28 NOTE — PROGRESS NOTES
Outpatient Psychiatry Follow-Up Visit (MD/NP)    11/28/2022      Clinical Status of Patient:  Outpatient (Virtual)  The patient location is: [address] [place] [address]  The patient phone number is: [phone]   Visit type: Virtual visit with synchronous audio and video  Each patient to whom he or she provides medical services by telemedicine is:  (1) informed of the relationship between the practitioner and patient and the respective role of any other health care provider with respect to management of the patient; and (2) notified that he or she may decline to receive medical services by telemedicine and may withdraw from such care at any time.      Chief Complaint:  Ion Garvey is a 27 y.o. male who presents today for follow-up of attention problems.  Met with patient.      Interval History and Content of Current Session:  Interim Events/Subjective Report/Content of Current Session:     Pt is a 27 y.o. male with past history of ADHD and depression who presents for follow up treatment. Pt established care with me on 11/16/21, where Pt met criteria for ADHD. Pt is currently taking Adderall XR 40 mg po q.a.m.  Pt reports past trials of bupropion, hydroxyzine.  Med tolerated well and provides good relief of symptoms overall.      Over the interim patient was unable to schedule a follow-up appointment due to financial issues.  Last visit was 6/14/2022.  Last Adderall fill date was 9/4/2022.  Patient has been without Adderall for approximately 2 months.  He reports during this time he was able to go back to the gym and gain some weight which is significant given that Adderall has caused decreased appetite in the past.  Patient states he noticed a difference in productivity at work noting he experienced increased difficulty in getting things done at work.  He reports an increase in the length of time it took to do regular tasks.  He also reports colleagues commented on his change in ability to  function work.    Patient notes who is unable to follow up with his primary care doctor for his elevated blood pressure over the interim but notes he will call his primary care to make an appointment today.    Pt denies cardiovascular events including increased heart rate or increased blood pressure, palpitations, chest pain, dizziness, lightheadedness, or syncopal episodes. Pt denies A/V hallucinations or behavioral effects. Pt denies anorexia, xerostomia, headache, insomnia, or digital changes.  Patient plans to schedule meals in order to avoid losing weight.      6/14/2022: In the interim, patient reports good control of symptoms of ADHD with increase in dose of Adderall XR.  He notes it has been helpful to be able to tailor his dose to the demands of the day.  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  He has reports he has made an appointment with his primary care provider to discuss his elevated blood pressure.    Patient reports decreased appetite continues to represent a problem and notes a weight loss of several lb recently.  He states he is going to speak with his supervisor about scheduling a daily lunch break so that he has time to eat.  He denies any other side effects of Adderall. Pt denies cardiovascular events including increased heart rate or blood pressure, palpitations, chest pain, dizziness, lightheadedness, or syncopal episodes. Pt denies A/V hallucinations or behavioral effects. Pt denies anorexia, xerostomia, headache, insomnia, or digital changes.      Initial HPI: Pt. is a 26 y.o. male, with a past psychiatric hx of ADHD presenting to the clinic for an initial evaluation and treatment. PMHx outlined below. 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 He reports that along with medication, he was provided psychoeducation and tools to cope with his symptoms. He was prescribed Adderall XR 20 mg q.a.m. which he reports helped to increase his ability to focus, organize, and follow through on tasks. The telemedicine company is no longer able to provide services across state lines, however, leading the Pt to establish care today. He reports frequent careless mistakes, difficulty sustaining attention, difficulty listening when others are speaking, difficulty following through, difficulty organizing tasks and activities, frequently losing things, distractibility, forgetfulness, restlessness, difficulty waiting turn, and frequent interruption of others.     He denies depressed mood or anxiety. He reports typically he has trouble falling asleep and multiple awakenings throughout the night, however, sleep has been better since he has been exercising over the last month. Reports he is easily fatigued. Appetite has also been better since working out over the last month since.    Initial Psych ROS:  Depression: denies depressed mood, anhedonia, appetite/weight changes, insomnia/hypersomnia, fatigue, feelings of worthlessness, hopelessness, or guilt, difficulty concentrating, or recurrent thoughts of death or SI  Isaura: denies episodes of expansive mood, decreased need for sleep, increased goal directed behaviors, or racing thoughts  Anxiety: denies panic attacks, agoraphobia, social anxiety, phobias, excessive worry, avoidance, or somatic related complaints   OCD: denies obsessions or compulsive behaviors  PTSD: denies " "flashbacks, nightmares, or avoidance of stimuli  Psychosis: denies A/V hallucinations or delusions   SI/HI - denies suicidal ideation, plan, or thoughts of harm to self or others  Access to guns: owns a rifle that is kept locked    Past Psychiatric hx:   First psych contact: in 2016 for anxiety and depression, [facility]    Prior hospitalizations: denies  Prior suicide attempts or self-harm: denies  Prior diagnosis: ADHD, anxiety, and depression  Prior meds: bupropion, hydroxyzine, adderall XR 20 mg  Current meds: none, telemedicine company closed, has been off of Adderall for 2 months  Prior psychotherapy: denies    Past Medical hx:   Past Medical History:   Diagnosis Date    Depression    Hx of TBI: denies  Hx of seizures: denies    Family Hx:   Paternal: dad - PTSD d/t service, "hermit";  Men on father's side of family prone to alcohol use disorder  Maternal: no psychiatric history or history of substance abuse or suicide  Siblings: brother - OCD anxiety      Social Hx:   Childhood: born in [facility]; dad left when Pt was 3, mom  again, moved frequently d/t ; in [place] since sophomore year of high school  Marital Status: never   Children: none  Resides: [place]  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     4/14/22  Increase Adderall XR to 40 mg po q.a.m.     3/14/22:  Continue Adderall XR 20 mg po q.a.m.     Medication changes on 11/16/21:  Start Adderall XR 20 mg po q.a.m.     Anxiety: denies  Depression: denies     Alcohol/Drugs/Caffeine/Tobacco: No change in " consumption    Review of Systems   PSYCHIATRIC: Pertinant items are noted in the narrative.  All other systems reviewed and found to be negative       Past Medical, Family and Social History: The patient's past medical, family and social history have been reviewed and updated as appropriate within the electronic medical record - see encounter notes.    Adherence: yes    Side effects: see above    Risk Parameters:  Patient reports no suicidal ideation  Patient reports no homicidal ideation  Patient reports no self-injurious behavior  Patient reports no violent behavior    Exam   Constitutional  Vitals:  Most recent vital signs, dated less than 90 days prior to this appointment, were reviewed.   Last 3 sets of Vitals    Vitals - 1 value per visit 4/14/2022 6/14/2022 6/14/2022   SYSTOLIC 133 - 133   DIASTOLIC 84 - 71   Pulse 62 - 71   Temp - - -   Resp - - 20   SPO2 - - -   Weight (lb) 134.37 - 132.61   Weight (kg) 60.95 - 60.15   Height 69 - -   BMI (Calculated) 19.8 - -   VISIT REPORT - - -   Pain Score  - 0 -          General:  unremarkable, age appropriate     Musculoskeletal  Muscle Strength/Tone:  Unable to assess due to nature of virtual visit   Gait & Station:  Unable to assess due to nature of virtual visit     Psychiatric  Speech:  no latency; no press   Mood & Affect:  euthymic  congruent and appropriate   Thought Process:  normal and logical   Associations:  intact   Thought Content:  normal, no suicidality, no homicidality, delusions, or paranoia   Insight:  intact   Judgement: behavior is adequate to circumstances   Orientation:  grossly intact   Memory: intact for content of interview   Language: grossly intact   Attention Span & Concentration:  able to focus   Fund of Knowledge:  intact and appropriate to age and level of education     Suicide Risk Assessment:  Protective factors: age, gender, no prior attempts, no prior hospitalizations, no ongoing substance abuse, no psychosis, denies SI/intent/plan,  seeking treatment, access to treatment, future oriented, good primary support  Risks: hx anxiety and depression, gender, access to firearms  Patient is a low immediate and long-term risk considering risk factors. Patient denied suicidal or homicidal ideation, plan, or intent.  Patient noted agreement to call 911 and/or present to the nearest emergency department if Pt develops suicidal or homicidal ideation, plan, or intent.      Assessment and Diagnosis   Status/Progress: Based on the examination today, the patient's problem(s) is/are well controlled.  New problems have not been presented today.   Co-morbidities are not complicating management of the primary condition.  There are no active rule-out diagnoses for this patient at this time.     General Impression: Pt is a 27 y.o. male with past history of ADHD.  Patient has been out of medication for approximately 2 months.  Through shared decision making, Adderall XR 40 mg p.o. q.a.m. will be restarted at this time. Discussed R/B/SE of stimulants including but not limited to cardiovascular events including increased heart rate and blood pressure, acute myocardial infarction, new-onset psychosis or rasheeda, AH/VH, serotonin syndrome, H/A, insomnia, xerostomia, and anorexia. Pt verbalized understanding.with Pt who verbalized understanding and states he wishes to proceed with treatment.  Patient agrees to contact this clinician should he develop side effects or worsening of symptoms.    This patient's profile was checked on the [place] Prescription Monitoring Program. No evidence of misuse.    Safe for outpatient follow up and no acute safety concerns.    Encounter Diagnosis   Name Primary?    Attention deficit hyperactivity disorder (ADHD), predominantly inattentive type Yes       Intervention/Counseling/Treatment Plan   Medication Management: The risks and benefits of medication were discussed with the patient. Shared decision making occurred   The treatment plan and  follow up plan were reviewed with the patient.   Restart Adderall XR 40 mg po q.a.m.   Schedule a visit with primary care provider for elevated blood pressure  Offered referral for individual psychotherapy.  Counseled on regular exercise, maintenance of a healthy weight, balanced diet rich in fruits/vegetables and lean protein, and avoidance of unhealthy habits like smoking and excessive alcohol intake.  Call to report any worsening of symptoms or problems with the medication. Pt instructed to go to ER with thoughts of harming self, others  Labs: no new orders    Attention deficit hyperactivity disorder (ADHD), predominantly inattentive type  -     dextroamphetamine-amphetamine (ADDERALL XR) 20 MG 24 hr capsule; Take 2 capsules (40 mg total) by mouth every morning.  Dispense: 60 capsule; Refill: 0  -     dextroamphetamine-amphetamine (ADDERALL XR) 20 MG 24 hr capsule; Take 2 capsules (40 mg total) by mouth every morning.  Dispense: 60 capsule; Refill: 0  -     dextroamphetamine-amphetamine (ADDERALL XR) 20 MG 24 hr capsule; Take 2 capsules (40 mg total) by mouth every morning.  Dispense: 60 capsule; Refill: 0      Psychotherapy:   Target symptoms: distractability, lack of focus  Outcome monitoring methods: self-report, observation, feedback from family  Therapeutic Intervention Type: supportive psychotherapy  Why chosen therapy is appropriate versus another modality: relevant to diagnosis, patient responds to this modality, evidence based practice  Patient's response to intervention:The patient's response to intervention is accepting.  Progress toward goals: The patient's progress toward goals is good.  Topics discussed/themes: building skills sets for symptom management, symptom recognition, nutrition, exercise  Duration of intervention: 10 minutes    Return to Clinic: 3 months        Medication Management: The risks and benefits of stimulant medication were discussed.     Patient has no contraindications: no h/o  allergic rxn, agitation, arrythmia, cardiovascular disease, cardiac structural abnormalities, hyperthyroidism, glaucoma, etc.   Completed cardiac screen prior to initiation.   Discussed stimulant side effects including effects on sleep, appetite, cardiac concerns, chest pain, psychosis, rasheeda, aggression, HTN, MI, stroke, arrythmia, seizure, anaphylaxis or other allergic reactions, leukopenia, nervousness, anorexia, insomnia, tachycardia, palpitations, dizziness, BP changes, HR changes, visual disturbance, and headaches  Discussed medication being a controlled substance, to place medication in a secured place, and the risk of dependency. Discussed to never use this medication in combination with illicit drugs, alcohol, or sedatives.   Patient to sign consent for treatment with a controlled substance document,.  Discussed diagnosis, risks and benefits of proposed treatment vs alternative treatments vs no treatment, and potential side effects of these treatments (death, dependency, etc.). The patient expresses understanding of the above and displays the capacity to agree with this treatment given said understanding. Patient also agrees that, currently, the benefits outweigh the risks and would like to pursue treatment at this time.    -Educated patient about appropriate treatment options incl. stimulant medication, nonstimulant medication, and therapy.   -Educated patient about appropriate use of ADHD medications, common side effects of the medications (cardiac being most significant) and when to call about complications.   -Take any c/o chest pain seriously & seek immediate medical attention.        -Pt given contact number for psychotherapists at [facility] and also instructed they may check with their health insurance provider for a list of providers  -Spent 30 minutes face to face with the Pt; >50% time spent in counseling   -Questions were sought and answered to the Pt's stated verbal  satisfaction.  -Supportive therapy and psychoeducation provided.  -Risks, benefits, and side effects of medications discussed with the Pt who expresses understanding and chooses to take medications as prescribed.   -Pt instructed to call clinic, 911, or go to nearest emergency room if symptoms worsen or pt is in crisis. The Pt expresses understanding.    DISCLAIMER: This note was prepared with [employer] voice recognition transcription software. Garbled syntax, mangled pronouns, and other bizarre constructions may be attributed to that software system     HOLLY Bledsoe, PMHNP-BC  Department of Psychiatry - [facility] [address]  [place] [address]  Office: [phone]  Fax: [phone]

## 2023-02-27 ENCOUNTER — PATIENT MESSAGE (OUTPATIENT)
Dept: PSYCHIATRY | Facility: CLINIC | Age: 28
End: 2023-02-27
Payer: COMMERCIAL

## 2023-02-28 ENCOUNTER — OFFICE VISIT (OUTPATIENT)
Dept: PSYCHIATRY | Facility: CLINIC | Age: 28
End: 2023-02-28
Payer: COMMERCIAL

## 2023-02-28 DIAGNOSIS — F90.0 ATTENTION DEFICIT HYPERACTIVITY DISORDER (ADHD), PREDOMINANTLY INATTENTIVE TYPE: Primary | ICD-10-CM

## 2023-02-28 PROCEDURE — 99214 PR OFFICE/OUTPT VISIT, EST, LEVL IV, 30-39 MIN: ICD-10-PCS | Mod: 95,,,

## 2023-02-28 PROCEDURE — 4010F PR ACE/ARB THEARPY RXD/TAKEN: ICD-10-PCS | Mod: CPTII,95,,

## 2023-02-28 PROCEDURE — 4010F ACE/ARB THERAPY RXD/TAKEN: CPT | Mod: CPTII,95,,

## 2023-02-28 PROCEDURE — 1160F PR REVIEW ALL MEDS BY PRESCRIBER/CLIN PHARMACIST DOCUMENTED: ICD-10-PCS | Mod: CPTII,95,,

## 2023-02-28 PROCEDURE — 1159F MED LIST DOCD IN RCRD: CPT | Mod: CPTII,95,,

## 2023-02-28 PROCEDURE — 1159F PR MEDICATION LIST DOCUMENTED IN MEDICAL RECORD: ICD-10-PCS | Mod: CPTII,95,,

## 2023-02-28 PROCEDURE — 99214 OFFICE O/P EST MOD 30 MIN: CPT | Mod: 95,,,

## 2023-02-28 PROCEDURE — 1160F RVW MEDS BY RX/DR IN RCRD: CPT | Mod: CPTII,95,,

## 2023-02-28 RX ORDER — LISDEXAMFETAMINE DIMESYLATE 50 MG/1
50 CAPSULE ORAL EVERY MORNING
Qty: 30 CAPSULE | Refills: 0 | Status: SHIPPED | OUTPATIENT
Start: 2023-02-28 | End: 2023-03-21

## 2023-02-28 NOTE — PROGRESS NOTES
Outpatient Psychiatry Follow-Up Visit (MD/NP)    2/28/2023      Clinical Status of Patient:  Outpatient (Virtual)  The patient location is: [address] [place] [address]  The patient phone number is: [phone]   Visit type: Virtual visit with synchronous audio and video  Each patient to whom he or she provides medical services by telemedicine is:  (1) informed of the relationship between the practitioner and patient and the respective role of any other health care provider with respect to management of the patient; and (2) notified that he or she may decline to receive medical services by telemedicine and may withdraw from such care at any time.      Chief Complaint:  Ion Garvey is a 28 y.o. male who presents today for follow-up. Met with patient.        LAST VISIT SYNOPSIS AND INTERVAL NOTES  Last seen 11/28/2022    General Impression: Pt is a 27 y.o. male with past history of ADHD.  Patient has been out of medication for approximately 2 months.  Through shared decision making, Adderall XR 40 mg p.o. q.a.m. will be restarted at this time. Discussed R/B/SE of stimulants including but not limited to cardiovascular events including increased heart rate and blood pressure, acute myocardial infarction, new-onset psychosis or rasheeda, AH/VH, serotonin syndrome, H/A, insomnia, xerostomia, and anorexia. Pt verbalized understanding.with Pt who verbalized understanding and states he wishes to proceed with treatment.  Patient agrees to contact this clinician should he develop side effects or worsening of symptoms. This patient's profile was checked on the [place] Prescription Monitoring Program. No evidence of misuse. Safe for outpatient follow up and no acute safety concerns.          Encounter Diagnosis   Name Primary?    Attention deficit hyperactivity disorder (ADHD), predominantly inattentive type      Plan:  Restart Adderall XR 40 mg po q.a.m.   Schedule a visit with primary care provider for  elevated blood pressure  Offered referral for individual psychotherapy      PSYCHOTROPIC MEDICATION HISTORY (Highest Dose)  bupropion, hydroxyzine      TODAY ON INTERVIEW  Interim Events/Subjective Report/Content of Current Session:     Patient reports he did follow up with his PCP regarding his blood pressure and has been started on Benicar which has controlled his hypertension    Patient has been unable to obtain Adderall for the last several weeks due to the current nationwide stimulant shortage.  He reports inattentive as well as hyperactive symptoms were well controlled.  He reports decreased appetite and xerostomia but denies other side effects of Adderall.    Patient denies SI/HI, significant sleep concerns, or medication adverse effects.  No interval episodes with symptoms consistent with rasheeda or hypomania.  Denied interval or current suicidal/homicidal thoughts, intent, or plan or NSSI.  Denied other questions and concerns.       11/28/2022:  Over the interim patient was unable to schedule a follow-up appointment due to financial issues.  Last visit was 6/14/2022.  Last Adderall fill date was 9/4/2022.  Patient has been without Adderall for approximately 2 months.  He reports during this time he was able to go back to the gym and gain some weight which is significant given that Adderall has caused decreased appetite in the past.  Patient states he noticed a difference in productivity at work noting he experienced increased difficulty in getting things done at work.  He reports an increase in the length of time it took to do regular tasks.  He also reports colleagues commented on his change in ability to function work.    Patient notes who is unable to follow up with his primary care doctor for his elevated blood pressure over the interim but notes he will call his primary care to make an appointment today.    Pt denies cardiovascular events including increased heart rate or increased blood pressure,  "palpitations, chest pain, dizziness, lightheadedness, or syncopal episodes. Pt denies A/V hallucinations or behavioral effects. Pt denies anorexia, xerostomia, headache, insomnia, or digital changes.  Patient plans to schedule meals in order to avoid losing weight.      Initial HPI: Anette is a 26 y.o. male, with a past psychiatric hx of ADHD presenting to the clinic for an initial evaluation and treatment. PMHx outlined below. 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 He reports that along with medication, he was provided psychoeducation and tools to cope with his symptoms. He was prescribed Adderall XR 20 mg q.a.m. which he reports helped to increase his ability to focus, organize, and follow through on tasks. The [employer] company is no longer able to provide services across state lines, however, leading the Pt to establish care today. He reports frequent careless mistakes, difficulty sustaining attention, difficulty listening when others are speaking, difficulty following through, difficulty organizing tasks and activities, frequently losing things, distractibility, forgetfulness, restlessness, difficulty waiting turn, and frequent interruption of others.     He denies depressed mood or anxiety. He reports typically he has trouble falling asleep and multiple awakenings throughout the night, however, sleep has been better since he has been exercising over the last month. Reports he is easily fatigued. Appetite has also been better since working out over the last month since.      Past Psychiatric hx:   First psych contact: in 2016 for anxiety and " "depression, [facility]    Prior hospitalizations: denies  Prior suicide attempts or self-harm: denies  Prior diagnosis: ADHD, anxiety, and depression  Prior meds: bupropion, hydroxyzine, adderall XR 20 mg  Current meds: none, telemedicine company closed, has been off of Adderall for 2 months  Prior psychotherapy: denies    Past Medical hx:   Past Medical History:   Diagnosis Date    Depression    Hx of TBI: denies  Hx of seizures: denies    Family Hx:   Paternal: dad - PTSD d/t service, "hermit";  Men on father's side of family prone to alcohol use disorder  Maternal: no psychiatric history or history of substance abuse or suicide  Siblings: brother - OCD anxiety      Social Hx:   Childhood: born in 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  All other systems reviewed and found to be negative       Interim hx:      Alcohol/Drugs/Caffeine/Tobacco: No change in consumption    Past Medical, Family and Social History: The patient's past medical, family and social history have been reviewed and updated as appropriate within the electronic medical record - see encounter notes.    Adherence: yes    Side effects: see above    Risk Parameters:  Patient reports no suicidal ideation  Patient reports no homicidal ideation  Patient reports no self-injurious behavior  Patient reports no violent " behavior    Exam   Constitutional  Vitals:  Most recent vital signs, dated less than 90 days prior to this appointment, were reviewed.   Last 3 sets of Vitals    Vitals - 1 value per visit 12/20/2022 12/20/2022 1/20/2023   SYSTOLIC 142 140 116   DIASTOLIC 88 90 78   Pulse 80 - 80   Temp 97.9 - 97.5   Resp 16 - 14   SPO2 100 - 95   Weight (lb) 140 - 141.2   Weight (kg) 63.504 - 64.048   Height 69 - 69   BMI (Calculated) 20.7 - 20.8   VISIT REPORT - - -   Pain Score  - - -          General:  unremarkable, age appropriate     Musculoskeletal  Muscle Strength/Tone:  No tremors appreciated   Gait & Station:  Unable to assess, Pt seated during virtual visit     Psychiatric  Speech:  no latency; no press   Mood & Affect:  euthymic  congruent and appropriate   Thought Process:  normal and logical   Associations:  intact   Thought Content:  normal, no suicidality, no homicidality, delusions, or paranoia   Insight:  intact   Judgement: behavior is adequate to circumstances   Orientation:  grossly intact   Memory: intact for content of interview   Language: grossly intact   Attention Span & Concentration:  able to focus   Fund of Knowledge:  intact and appropriate to age and level of education     Suicide Risk Assessment:  Protective factors: age, gender, no prior attempts, no prior hospitalizations, no ongoing substance abuse, no psychosis, denies SI/intent/plan, seeking treatment, access to treatment, future oriented, good primary support  Risks: hx anxiety and depression, gender, access to firearms  Patient is a low immediate and long-term risk considering risk factors.     Assessment and Diagnosis   Status/Progress: Based on the examination today, the patient's problem(s) is/are well controlled.  New problems have not been presented today.   Co-morbidities are not complicating management of the primary condition.  There are no active rule-out diagnoses for this patient at this time.     General Impression: Pt is a 28 y.o.  male with past history of ADHD who presents today for follow-up treatment.      The patient reports well controlled symptoms and denies any current or interval psychiatric complaints. The patient appears stable, and MSE remains unremarkable. Patient is not in immediate risk of harm to self or others and is not considered at risk of deterioration. Patient instructed to contact the clinic if symptoms worsen or if questions concerning treatment plan arise. No changes in management. Plan as follows:        Patient has been out of medication for approximately 2 months.  Through shared decision making, Adderall XR 40 mg p.o. q.a.m. will be restarted at this time. Discussed R/B/SE of stimulants including but not limited to cardiovascular events including increased heart rate and blood pressure, acute myocardial infarction, new-onset psychosis or rasheeda, AH/VH, serotonin syndrome, H/A, insomnia, xerostomia, and anorexia. Pt verbalized understanding.with Pt who verbalized understanding and states he wishes to proceed with treatment.  Patient agrees to contact this clinician should he develop side effects or worsening of symptoms.    This patient's profile was checked on the [place] Prescription Monitoring Program. No evidence of misuse.    Safe for outpatient follow up and no acute safety concerns.    Encounter Diagnosis   Name Primary?    Attention deficit hyperactivity disorder (ADHD), predominantly inattentive type Yes         Intervention/Counseling/Treatment Plan   Medication Management: The risks and benefits of medication were discussed with the patient. Shared decision making occurred   The treatment plan and follow up plan were reviewed with the patient.     Discontinue Adderall XR due to unavailability  Start Vyvanse 50 mg q.a.m. for ADHD  Offered referral for individual psychotherapy.    Counseled on regular exercise, maintenance of a healthy weight, balanced diet rich in fruits/vegetables and lean protein, and  avoidance of unhealthy habits like smoking and excessive alcohol intake.  Call to report any worsening of symptoms or problems with the medication. Pt instructed to go to ER with thoughts of harming self, others  Labs: no new orders      Psychotherapy:   Target symptoms: distractability, lack of focus  Outcome monitoring methods: self-report, observation, feedback from family  Therapeutic Intervention Type: supportive psychotherapy  Why chosen therapy is appropriate versus another modality: relevant to diagnosis, patient responds to this modality, evidence based practice  Patient's response to intervention:The patient's response to intervention is accepting.  Progress toward goals: The patient's progress toward goals is good.  Topics discussed/themes: building skills sets for symptom management, symptom recognition, nutrition, exercise  Duration of intervention: 10 minutes    Return to Clinic: 3 months        Medication Management: The risks and benefits of stimulant medication were discussed.     Patient has no contraindications: no h/o allergic rxn, agitation, arrythmia, cardiovascular disease, cardiac structural abnormalities, hyperthyroidism, glaucoma, etc.   Completed cardiac screen prior to initiation.   Discussed stimulant side effects including effects on sleep, appetite, cardiac concerns, chest pain, psychosis, rasheeda, aggression, HTN, MI, stroke, arrythmia, seizure, anaphylaxis or other allergic reactions, leukopenia, nervousness, anorexia, insomnia, tachycardia, palpitations, dizziness, BP changes, HR changes, visual disturbance, and headaches  Discussed medication being a controlled substance, to place medication in a secured place, and the risk of dependency. Discussed to never use this medication in combination with illicit drugs, alcohol, or sedatives.   Patient to sign consent for treatment with a controlled substance document,.  Discussed diagnosis, risks and benefits of proposed treatment vs  alternative treatments vs no treatment, and potential side effects of these treatments (death, dependency, etc.). The patient expresses understanding of the above and displays the capacity to agree with this treatment given said understanding. Patient also agrees that, currently, the benefits outweigh the risks and would like to pursue treatment at this time.    -Educated patient about appropriate treatment options incl. stimulant medication, nonstimulant medication, and therapy.   -Educated patient about appropriate use of ADHD medications, common side effects of the medications (cardiac being most significant) and when to call about complications.   -Take any c/o chest pain seriously & seek immediate medical attention.        30 minutes of total time spent on the encounter, which includes face to face time and non-face to face time preparing to see the patient (eg. review of tests), obtaining and/or reviewing separately obtained history, documenting clinical information in the electronic health record, independently interpreting results (not separately reported), and communicating results to the patient/family/caregiver, or care coordination (not separately reported).       -Questions were sought and answered to the Pt's stated verbal satisfaction.  -Supportive therapy and psychoeducation provided.  -Risks, benefits, and side effects of medications discussed with the Pt who expresses understanding and chooses to take medications as prescribed.   -Pt instructed to call clinic, 911, or go to nearest emergency room if symptoms worsen or pt is in crisis. The Pt expresses understanding.    DISCLAIMER: This note was prepared with [employer] voice recognition transcription software. Garbled syntax, mangled pronouns, and other bizarre constructions may be attributed to that software system     HOLLY Bledsoe, [id number]  Department of Psychiatry - [facility]  [address] [facility] [facility]  Approach  [place] [id number]  Office: [phone]  Fax: [phone]

## 2023-03-21 DIAGNOSIS — F90.0 ATTENTION DEFICIT HYPERACTIVITY DISORDER (ADHD), PREDOMINANTLY INATTENTIVE TYPE: ICD-10-CM

## 2023-03-21 RX ORDER — DEXTROAMPHETAMINE SACCHARATE, AMPHETAMINE ASPARTATE MONOHYDRATE, DEXTROAMPHETAMINE SULFATE AND AMPHETAMINE SULFATE 5; 5; 5; 5 MG/1; MG/1; MG/1; MG/1
40 CAPSULE, EXTENDED RELEASE ORAL EVERY MORNING
Qty: 60 CAPSULE | Refills: 0 | Status: SHIPPED | OUTPATIENT
Start: 2023-03-21 | End: 2023-08-10 | Stop reason: SDUPTHER

## 2023-08-10 ENCOUNTER — OFFICE VISIT (OUTPATIENT)
Dept: PSYCHIATRY | Facility: CLINIC | Age: 28
End: 2023-08-10
Payer: COMMERCIAL

## 2023-08-10 VITALS — SYSTOLIC BLOOD PRESSURE: 130 MMHG | DIASTOLIC BLOOD PRESSURE: 87 MMHG | HEART RATE: 76 BPM

## 2023-08-10 DIAGNOSIS — F90.0 ATTENTION DEFICIT HYPERACTIVITY DISORDER (ADHD), PREDOMINANTLY INATTENTIVE TYPE: ICD-10-CM

## 2023-08-10 PROCEDURE — 4010F PR ACE/ARB THEARPY RXD/TAKEN: ICD-10-PCS | Mod: CPTII,S$GLB,,

## 2023-08-10 PROCEDURE — 99999 PR PBB SHADOW E&M-EST. PATIENT-LVL II: CPT | Mod: PBBFAC,,,

## 2023-08-10 PROCEDURE — 99214 PR OFFICE/OUTPT VISIT, EST, LEVL IV, 30-39 MIN: ICD-10-PCS | Mod: S$GLB,,,

## 2023-08-10 PROCEDURE — 1159F PR MEDICATION LIST DOCUMENTED IN MEDICAL RECORD: ICD-10-PCS | Mod: CPTII,S$GLB,,

## 2023-08-10 PROCEDURE — 99214 OFFICE O/P EST MOD 30 MIN: CPT | Mod: S$GLB,,,

## 2023-08-10 PROCEDURE — 1159F MED LIST DOCD IN RCRD: CPT | Mod: CPTII,S$GLB,,

## 2023-08-10 PROCEDURE — 1160F PR REVIEW ALL MEDS BY PRESCRIBER/CLIN PHARMACIST DOCUMENTED: ICD-10-PCS | Mod: CPTII,S$GLB,,

## 2023-08-10 PROCEDURE — 4010F ACE/ARB THERAPY RXD/TAKEN: CPT | Mod: CPTII,S$GLB,,

## 2023-08-10 PROCEDURE — 99999 PR PBB SHADOW E&M-EST. PATIENT-LVL II: ICD-10-PCS | Mod: PBBFAC,,,

## 2023-08-10 PROCEDURE — 1160F RVW MEDS BY RX/DR IN RCRD: CPT | Mod: CPTII,S$GLB,,

## 2023-08-10 RX ORDER — DEXTROAMPHETAMINE SACCHARATE, AMPHETAMINE ASPARTATE MONOHYDRATE, DEXTROAMPHETAMINE SULFATE AND AMPHETAMINE SULFATE 5; 5; 5; 5 MG/1; MG/1; MG/1; MG/1
40 CAPSULE, EXTENDED RELEASE ORAL EVERY MORNING
Qty: 60 CAPSULE | Refills: 0 | Status: SHIPPED | OUTPATIENT
Start: 2023-09-10 | End: 2023-10-10

## 2023-08-10 RX ORDER — DEXTROAMPHETAMINE SACCHARATE, AMPHETAMINE ASPARTATE MONOHYDRATE, DEXTROAMPHETAMINE SULFATE AND AMPHETAMINE SULFATE 5; 5; 5; 5 MG/1; MG/1; MG/1; MG/1
40 CAPSULE, EXTENDED RELEASE ORAL EVERY MORNING
Qty: 60 CAPSULE | Refills: 0 | Status: SHIPPED | OUTPATIENT
Start: 2023-08-10 | End: 2023-09-09

## 2023-08-10 RX ORDER — DEXTROAMPHETAMINE SACCHARATE, AMPHETAMINE ASPARTATE MONOHYDRATE, DEXTROAMPHETAMINE SULFATE AND AMPHETAMINE SULFATE 5; 5; 5; 5 MG/1; MG/1; MG/1; MG/1
40 CAPSULE, EXTENDED RELEASE ORAL EVERY MORNING
Qty: 60 CAPSULE | Refills: 0 | Status: SHIPPED | OUTPATIENT
Start: 2023-10-10 | End: 2023-10-26 | Stop reason: SDUPTHER

## 2023-08-10 NOTE — PROGRESS NOTES
"OUTPATIENT PSYCHIATRY FOLLOW UP VISIT    Encounter Date: 8/10/2023    Clinical Status of Patient:  Outpatient (Ambulatory)    Chief Complaint:  Ion Garvey is a 28 y.o. male who presents today for follow-up.  Met with patient.      HISTORY OF PRESENTING ILLNESS:  Ion Garvey is a 28 y.o. male with history of ADHD-IT who presents for follow up appointment.      INITIAL HPI:  Pt. is a 26 y.o. male, with a past psychiatric hx of ADHD-IT presenting to the clinic for an initial evaluation and treatment. PMHx outlined below. 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 He reports that along with medication, he was provided psychoeducation and tools to cope with his symptoms. He was prescribed Adderall XR 20 mg q.a.m. which he reports helped to increase his ability to focus, organize, and follow through on tasks. The telemedicine company is no longer able to provide services across state lines, however, leading the Pt to establish care today. He reports frequent careless mistakes, difficulty sustaining attention, difficulty listening when others are speaking, difficulty following through, difficulty organizing tasks and activities, frequently losing things, distractibility, forgetfulness, restlessness, difficulty waiting turn, and frequent interruption of others.     He denies depressed mood or anxiety. He reports typically he has trouble falling asleep and multiple awakenings throughout the night, however, sleep has been better since he has been exercising over the last month. Reports he is easily fatigued. Appetite has also been better since " "working out over the last month since.    2/28/2023: Patient reports he did follow up with his PCP regarding his blood pressure and has been started on Benicar which has controlled his hypertension     Patient has been unable to obtain Adderall for the last several weeks due to the current nationwide stimulant shortage.  He reports inattentive as well as hyperactive symptoms were well controlled.  He reports decreased appetite and xerostomia but denies other side effects of Adderall.      Plan at last appointment on 2/28/2023:  Discontinue Adderall XR due to unavailability  Start Vyvanse 50 mg q.a.m. for ADHD  Offered referral for individual psychotherapy.    Psychotropic medication history:   bupropion, hydroxyzine      INTERVAL HISTORY:    Was unable to fill Vyvanse d/t cost. Continued dextroamphetamine amphetamine ER. Was unable to follow up until now.     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 "My life just got busier."  "With starting the store there has not been balance between work and life." Not able to function at work or at home. "It took a week to do dishes." Had hard deadlines to meet to open store. "I cannot experience accomplishment, it's like 'oh thank god that's over.'"  Very difficult to meet deadlines and have any life outside of that. 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 "I try to be as effective without medication as possible, but the paralysis side of it is bad." Has been reading [employer].     Previously started on Benicar by PCP for HTN.     No interval episodes with symptoms consistent with rasheeda or hypomania.  Denied interval or current suicidal/homicidal thoughts, intent, or plan or NSSI.  Denied other questions and concerns.  Patient reports feeling stable and wishing to continue current management unchanged.    Medication side effects: None  Medication adherence: yes, but " "    PSYCHIATRIC REVIEW OF SYSTEMS:  Is patient experiencing or having changes in:  Trouble with sleep:  no  Appetite changes:  no  Weight changes:  no  Lack of energy:  +fatigue  Anhedonia:  no  Somatic symptoms:  no  Anxiety/panic:  no  Irritability: no  Guilty/hopeless:  no  Concentration: difficulty at baseline   Racing thoughts: no  Impulsive behaviors: no  Paranoia/AVH: no  Self-injurious behavior/risky behavior:  no  Any drugs:  no  Alcohol:  no      MEDICAL REVIEW OF SYSTEMS:   Pain: Denies any significant chronic or acute pain.  Constitutional: Denies fever or change in appetite.  Cardiovascular: Denies chest pain or exertional dyspnea.  Respiratory: Denies cough or orthopnea.   GI: Denies abdominal pain, N/V  Neurological: Denies tremor, seizure, or focal weakness.  Psychiatric: See HPI above.    PAST PSYCHIATRIC, MEDICAL, AND SOCIAL HISTORY REVIEWED  The patient's past medical, family and social history have been reviewed and updated as appropriate within the electronic medical record - see encounter notes.    PAST MEDICAL HISTORY:   Past Medical History:   Diagnosis Date    Depression     Head trauma/Loss of consciousness: denies  Seizures: denies     PAST PSYCHIATRIC HISTORY:  First psych contact: in 2016 for anxiety and depression, [facility]    Prior hospitalizations: denies  Prior suicide attempts or self-harm: denies  Prior diagnosis: ADHD, anxiety, and depression  Prior meds: bupropion, hydroxyzine, adderall XR 20 mg  Current meds: none, telemedicine company closed, has been off of Adderall for 2 months  Prior psychotherapy: denies    FAMILY HISTORY:   Paternal: dad - PTSD d/t service, "hermit";  Men on father's side of family prone to alcohol use disorder  Maternal: no psychiatric history or history of substance abuse or suicide  Siblings: brother - OCD anxiety     SOCIAL HISTORY:   Childhood: born in [facility]; dad left when Pt was 3, mom  again, moved frequently d/t " ; in [place] since sophomore year of high school  Marital Status: never   Children: none  Resides: [place]  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  Hobbies: musician, sings, plays guitar   Confucianism: denies  Education level: some college  :  denies  Legal: denies  Access to guns: owns a rifle that is kept locked    SUBSTANCE USE HISTORY:  Caffeine: coffee 1-2 daily  Tobacco: denies  Alcohol: occasional - 1-2 x week; 3 beers per occasion  Drug use: denies  Rehab: denies  Prior/current AA: denies    MEDICATIONS:    Current Outpatient Medications:     dextroamphetamine-amphetamine (ADDERALL XR) 20 MG 24 hr capsule, Take 2 capsules (40 mg total) by mouth every morning., Disp: 60 capsule, Rfl: 0    [START ON 9/10/2023] dextroamphetamine-amphetamine (ADDERALL XR) 20 MG 24 hr capsule, Take 2 capsules (40 mg total) by mouth every morning., Disp: 60 capsule, Rfl: 0    [START ON 10/10/2023] dextroamphetamine-amphetamine (ADDERALL XR) 20 MG 24 hr capsule, Take 2 capsules (40 mg total) by mouth every morning., Disp: 60 capsule, Rfl: 0    famotidine (PEPCID AC MAXIMUM STRENGTH) 20 MG tablet, Take 1 tablet (20 mg total) by mouth 2 (two) times daily. for 5 days, Disp: 10 tablet, Rfl: 0    olmesartan (BENICAR) 20 MG tablet, Take 1 tablet (20 mg total) by mouth once daily., Disp: 90 tablet, Rfl: 1    ALLERGIES:  Review of patient's allergies indicates:   Allergen Reactions    Cephalexin     Sulfamethoxazole-trimethoprim        EXAM:  Constitutional  Vitals:  Most recent vital signs were reviewed.   Last 3 sets of VS:  Vitals - 1 value per visit 12/20/2022 1/20/2023 8/10/2023   SYSTOLIC 140 116 130   DIASTOLIC 90 78 87   Pulse - 80 76   Temp - 97.5 -   Resp - 14 -   SPO2 - 95 -   Weight (lb) - 141.2 -   Weight (kg) - 64.048 -   Height - 69 -   BMI (Calculated) - 20.8 -   VISIT REPORT [id number]  [id number]; [id number]  [id number]; [id number]  [id number];   Pain  "Score  - - -      General:  unremarkable, age appropriate     Musculoskeletal  Muscle Strength/Tone:  No tremor or abnormal movements   Gait & Station:  Steady, non-ataxic     Psychiatric  Speech:  no latency; no press   Mood & Affect:  euthymic  congruent and appropriate   Thought Process:  normal and logical   Associations:  intact   Thought Content:  normal, no suicidality, no homicidality, delusions, or paranoia   Insight:  intact   Judgement: behavior is adequate to circumstances   Orientation:  grossly intact   Memory: intact for content of interview   Language: grossly intact   Attention Span & Concentration:  able to focus   Fund of Knowledge:  intact and appropriate to age and level of education     SUICIDE RISK ASSESSMENT:  Protective factors: age, gender, no prior attempts, no prior hospitalizations, no ongoing substance abuse, no psychosis, denies SI/intent/plan, seeking treatment, access to treatment, future oriented, good primary support  Risks: hx anxiety and depression, gender, access to firearms  Patient is a low immediate and long-term risk considering risk factors.     RELEVANT LABS/STUDIES:    Lab Results   Component Value Date    WBC 8.2 04/06/2021    HGB 16.4 04/06/2021    HCT 48.9 04/06/2021    MCV 93.3 04/06/2021     04/06/2021     BMP  Lab Results   Component Value Date     02/12/2020    K 4.5 02/12/2020     02/12/2020    CO2 27 02/12/2020    BUN 13 02/12/2020    CREATININE 0.96 02/12/2020    CALCIUM 10.2 02/12/2020    ESTGFRAFRICA 125 02/12/2020    EGFRNONAA 108 02/12/2020     Lab Results   Component Value Date    ALT 13 02/12/2020    AST 13 02/12/2020    ALKPHOS 77 02/12/2020    BILITOT 1.1 02/12/2020     Lab Results   Component Value Date    TSH 0.85 02/12/2020     No results found for: "LABA1C", "HGBA1C"  Lab Results   Component Value Date    CHOL 116 02/12/2020    TRIG 73 02/12/2020    HDL 64 02/12/2020    LDLCALC 37 02/12/2020    CHOLHDL 1.8 02/12/2020 "       IMPRESSION:    Ion Garvey is a 28 y.o. male with history of ADHD-IT who presents for follow up appointment.    Status/Progress: Based on the examination today, the patient's problem(s) is/are inadequately controlled.  New problems have not been presented today.   Co-morbidities are not complicating management of the primary condition.  There are no active rule-out diagnoses for this patient at this time.     Risk Parameters:  Patient reports no suicidal ideation  Patient reports no homicidal ideation  Patient reports no self-injurious behavior  Patient reports no violent behavior    DIAGNOSES:    ICD-10-CM ICD-9-CM   1. Attention deficit hyperactivity disorder (ADHD), predominantly inattentive type  F90.0 314.00       PLAN:  START dextroamphetamine & amphetamine extended release 20-40 mg q.a.m. for ADHD-IT  Did not start Vyvanse due to cost      RETURN TO CLINIC:   3 months      HOLLY Bledsoe, PMHNP-BC      30 minutes of total time spent on the encounter, which includes face to face time and non-face to face time preparing to see the patient (eg. review of tests), obtaining and/or reviewing separately obtained history, documenting clinical information in the electronic health record, independently interpreting results (not separately reported), and communicating results to the patient/family/caregiver, or care coordination (not separately reported).     At this time there are no indications the patient represents an imminent danger to either themselves or others; will continue to manage treatment in the outpatient setting.    I discussed the patient's care with the patient including benefits, alternatives, possible adverse effects of the treatment plan; including the potential for metabolic complications, major organ dysfunction, black box warnings, and contraindications. The opportunity was given for questions/clarification, and after this discussion the above treatment plan was devised  "through shared decision making. The patient voiced their understanding of the diagnoses and treatments listed above and agreed to the treatment plan. Follow up plan was reviewed with the patient. The patient was advised to call to report any worsening of symptoms or problems with medication.    Supportive therapy and psychoeducation provided. I discussed the importance of regular exercise, maintenance of a healthy weight, balanced diet rich in fruits/vegetables and lean protein, and avoidance of unhealthy habits like smoking and excessive alcohol intake.     Patient has been given crisis information including Suicide and Crisis Lifeline (call or text: [phone]). Patient also given instructions to go to the nearest ER or call 911 if unable to remain safe or if the Pt develops thoughts of harming self or others.    [facility]: Reviewed today to detect potential controlled substance misuse, diversion, excessive prescribing, or multiple providers prescribing controlled substances. The patients report was deemed appropriate without new medications of concern prescribed by other providers.    Documentation entered by me for this encounter may have been done in part using [employer]. Direct voice recognition transcription software. Garbled syntax, mangled pronouns, and other bizarre constructions may be attributed to that software system. Although I have made an effort to ensure accuracy, "sound like" errors may exist and should be interpreted in context.  "

## 2023-10-26 ENCOUNTER — TELEPHONE (OUTPATIENT)
Dept: PSYCHIATRY | Facility: CLINIC | Age: 28
End: 2023-10-26
Payer: COMMERCIAL

## 2023-10-26 DIAGNOSIS — F90.0 ATTENTION DEFICIT HYPERACTIVITY DISORDER (ADHD), PREDOMINANTLY INATTENTIVE TYPE: ICD-10-CM

## 2023-10-26 RX ORDER — DEXTROAMPHETAMINE SACCHARATE, AMPHETAMINE ASPARTATE MONOHYDRATE, DEXTROAMPHETAMINE SULFATE AND AMPHETAMINE SULFATE 5; 5; 5; 5 MG/1; MG/1; MG/1; MG/1
40 CAPSULE, EXTENDED RELEASE ORAL EVERY MORNING
Qty: 60 CAPSULE | Refills: 0 | Status: SHIPPED | OUTPATIENT
Start: 2023-10-26 | End: 2024-01-03 | Stop reason: SDUPTHER

## 2024-01-03 ENCOUNTER — OFFICE VISIT (OUTPATIENT)
Dept: PSYCHIATRY | Facility: CLINIC | Age: 29
End: 2024-01-03
Payer: COMMERCIAL

## 2024-01-03 DIAGNOSIS — F90.0 ATTENTION DEFICIT HYPERACTIVITY DISORDER (ADHD), PREDOMINANTLY INATTENTIVE TYPE: ICD-10-CM

## 2024-01-03 PROCEDURE — 1159F MED LIST DOCD IN RCRD: CPT | Mod: CPTII,95,,

## 2024-01-03 PROCEDURE — 1160F RVW MEDS BY RX/DR IN RCRD: CPT | Mod: CPTII,95,,

## 2024-01-03 PROCEDURE — 99214 OFFICE O/P EST MOD 30 MIN: CPT | Mod: 95,,,

## 2024-01-03 RX ORDER — DEXTROAMPHETAMINE SACCHARATE, AMPHETAMINE ASPARTATE MONOHYDRATE, DEXTROAMPHETAMINE SULFATE AND AMPHETAMINE SULFATE 5; 5; 5; 5 MG/1; MG/1; MG/1; MG/1
40 CAPSULE, EXTENDED RELEASE ORAL EVERY MORNING
Qty: 60 CAPSULE | Refills: 0 | Status: SHIPPED | OUTPATIENT
Start: 2024-03-03 | End: 2024-04-02

## 2024-01-03 RX ORDER — DEXTROAMPHETAMINE SACCHARATE, AMPHETAMINE ASPARTATE MONOHYDRATE, DEXTROAMPHETAMINE SULFATE AND AMPHETAMINE SULFATE 5; 5; 5; 5 MG/1; MG/1; MG/1; MG/1
40 CAPSULE, EXTENDED RELEASE ORAL EVERY MORNING
Qty: 60 CAPSULE | Refills: 0 | Status: SHIPPED | OUTPATIENT
Start: 2024-01-03 | End: 2024-02-02

## 2024-01-03 RX ORDER — DEXTROAMPHETAMINE SACCHARATE, AMPHETAMINE ASPARTATE MONOHYDRATE, DEXTROAMPHETAMINE SULFATE AND AMPHETAMINE SULFATE 5; 5; 5; 5 MG/1; MG/1; MG/1; MG/1
40 CAPSULE, EXTENDED RELEASE ORAL EVERY MORNING
Qty: 60 CAPSULE | Refills: 0 | Status: SHIPPED | OUTPATIENT
Start: 2024-02-03 | End: 2024-03-04

## 2024-01-03 NOTE — PROGRESS NOTES
"OUTPATIENT PSYCHIATRY FOLLOW UP VISIT    Encounter Date: 1/3/2024    Clinical Status of Patient:  Outpatient (Virtual)  The patient location is: [address] [place] [address]  The patient phone number is: [phone]   Visit type: Virtual visit with synchronous audio and video  Each patient to whom he or she provides medical services by telemedicine is:  (1) informed of the relationship between the practitioner and patient and the respective role of any other health care provider with respect to management of the patient; and (2) notified that he or she may decline to receive medical services by telemedicine and may withdraw from such care at any time.    Chief Complaint:  Ion Garvey is a 28 y.o. male who presents today for follow-up.  Met with patient.      HISTORY OF PRESENTING ILLNESS:  Ion Garvey is a 28 y.o. male with history of ADHD-IT who presents for follow up appointment.      8/10/2023: Was unable to fill Vyvanse d/t cost. Continued dextroamphetamine amphetamine ER. Was unable to follow up until now.     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 "My life just got busier."  "With starting the store there has not been balance between work and life." Not able to function at work or at home. "It took a week to do dishes." Had hard deadlines to meet to open store. "I cannot experience accomplishment, it's like 'oh thank god that's over.'"  Very difficult to meet deadlines and have any life outside of that. 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 "I try to be as effective without medication as possible, but the paralysis side of it is bad." Has been reading [employer].     Previously started on Benicar by PCP for HTN.       Plan at last appointment:  START dextroamphetamine & amphetamine extended release 20-40 mg q.a.m. for ADHD-IT  Did not start Vyvanse due to " cost    Psychotropic medication history:   bupropion, hydroxyzine      INTERVAL HISTORY:    ADHD   Inattentive symptoms:  Controlled  Hyperactive symptoms:  Controlled  Behavior at home:  Stable  Behavior at work:  Stable, productive  Side effects:  Pt denies cardiovascular events including increased heart rate, palpitations, chest pain, dizziness, lightheadedness, or syncopal episodes. Pt denies A/V hallucinations or behavioral effects. Pt denies anorexia, decreased appetite, xerostomia, headache, insomnia, or digital changes.     Patient reports he was unable to refill medications due to cost.  Symptoms are well controlled when he is able to afford medication.     BP continues to be controlled with olmesartan     No interval episodes with symptoms consistent with rasheeda or hypomania.  Denied interval or current suicidal/homicidal thoughts, intent, or plan or NSSI.  Denied other questions and concerns.  Patient reports feeling stable and wishing to continue current management unchanged.    Medication side effects: None  Medication adherence: yes, but     PSYCHIATRIC REVIEW OF SYSTEMS:  Is patient experiencing or having changes in:  Trouble with sleep:  no  Appetite changes:  no  Weight changes:  no  Lack of energy:  +fatigue  Anhedonia:  no  Somatic symptoms:  no  Anxiety/panic:  no  Irritability: no  Guilty/hopeless:  no  Concentration: difficulty at baseline   Racing thoughts: no  Impulsive behaviors: no  Paranoia/AVH: no  Self-injurious behavior/risky behavior:  no  Any drugs:  no  Alcohol:  no      MEDICAL REVIEW OF SYSTEMS:   Pain: Denies any significant chronic or acute pain.  Constitutional: Denies fever or change in appetite.  Cardiovascular: Denies chest pain or exertional dyspnea.  Respiratory: Denies cough or orthopnea.   GI: Denies abdominal pain, N/V  Neurological: Denies tremor, seizure, or focal weakness.  Psychiatric: See HPI above.    PAST PSYCHIATRIC, MEDICAL, AND SOCIAL HISTORY REVIEWED  The  "patient's past medical, family and social history have been reviewed and updated as appropriate within the electronic medical record - see encounter notes.    PAST MEDICAL HISTORY:   Past Medical History:   Diagnosis Date    Depression     Head trauma/Loss of consciousness: denies  Seizures: denies     PAST PSYCHIATRIC HISTORY:  First psych contact: in 2016 for anxiety and depression, [facility]    Prior hospitalizations: denies  Prior suicide attempts or self-harm: denies  Prior diagnosis: ADHD, anxiety, and depression  Prior psychotherapy: denies      MEDICATIONS:    Current Outpatient Medications:     dextroamphetamine-amphetamine (ADDERALL XR) 20 MG 24 hr capsule, Take 2 capsules (40 mg total) by mouth every morning., Disp: 60 capsule, Rfl: 0    [START ON 2/3/2024] dextroamphetamine-amphetamine (ADDERALL XR) 20 MG 24 hr capsule, Take 2 capsules (40 mg total) by mouth every morning., Disp: 60 capsule, Rfl: 0    [START ON 3/3/2024] dextroamphetamine-amphetamine (ADDERALL XR) 20 MG 24 hr capsule, Take 2 capsules (40 mg total) by mouth every morning., Disp: 60 capsule, Rfl: 0    famotidine (PEPCID AC MAXIMUM STRENGTH) 20 MG tablet, Take 1 tablet (20 mg total) by mouth 2 (two) times daily. for 5 days, Disp: 10 tablet, Rfl: 0    olmesartan (BENICAR) 20 MG tablet, Take 1 tablet (20 mg total) by mouth once daily., Disp: 30 tablet, Rfl: 1    ALLERGIES:  Review of patient's allergies indicates:   Allergen Reactions    Cephalexin     Sulfamethoxazole-trimethoprim        EXAM:  Constitutional  Vitals:  Most recent vital signs were reviewed.   Last 3 sets of VS:      12/20/2022     2:29 PM 1/20/2023     1:54 PM 8/10/2023    12:10 PM   Vitals - 1 value per visit   SYSTOLIC 142 116 130   DIASTOLIC 88 78 87   Pulse 80 80 76   Temp 97.9 °F (36.6 °C) 97.5 °F (36.4 °C)    Resp 16 14    SPO2 100 % 95 %    Weight (lb) 140 141.2    Weight (kg) 63.504 64.048    Height 5' 9" (1.753 m) 5' 9" (1.753 m)    BMI (Calculated) 20.7 " "20.8    Pain Score Zero        General:  unremarkable, age appropriate     Musculoskeletal  Muscle Strength/Tone:  No tremors appreciated   Gait & Station:  Unable to assess, Pt seated during virtual visit     Psychiatric  Speech:  no latency; no press   Mood & Affect:  euthymic  congruent and appropriate   Thought Process:  normal and logical   Associations:  intact   Thought Content:  normal, no suicidality, no homicidality, delusions, or paranoia   Insight:  intact   Judgement: behavior is adequate to circumstances   Orientation:  grossly intact   Memory: intact for content of interview   Language: grossly intact   Attention Span & Concentration:  Intact to interview   Fund of Knowledge:  intact and appropriate to age and level of education     SUICIDE RISK ASSESSMENT:  Protective factors: age, gender, no prior attempts, no prior hospitalizations, no ongoing substance abuse, no psychosis, denies SI/intent/plan, seeking treatment, access to treatment, future oriented, good primary support  Risks: hx anxiety and depression, gender, access to firearms  Patient is a low immediate and long-term risk considering risk factors.     RELEVANT LABS/STUDIES:    Lab Results   Component Value Date    WBC 8.2 04/06/2021    HGB 16.4 04/06/2021    HCT 48.9 04/06/2021    MCV 93.3 04/06/2021     04/06/2021     BMP  Lab Results   Component Value Date     02/12/2020    K 4.5 02/12/2020     02/12/2020    CO2 27 02/12/2020    BUN 13 02/12/2020    CREATININE 0.96 02/12/2020    CALCIUM 10.2 02/12/2020    ESTGFRAFRICA 125 02/12/2020    EGFRNONAA 108 02/12/2020     Lab Results   Component Value Date    ALT 13 02/12/2020    AST 13 02/12/2020    ALKPHOS 77 02/12/2020    BILITOT 1.1 02/12/2020     Lab Results   Component Value Date    TSH 0.85 02/12/2020     No results found for: "LABA1C", "HGBA1C"  Lab Results   Component Value Date    CHOL 116 02/12/2020    TRIG 73 02/12/2020    HDL 64 02/12/2020    LDLCALC 37 02/12/2020 "    CHOLHDL 1.8 02/12/2020       IMPRESSION:    Ion Garvey is a 28 y.o. male with history of ADHD-IT who presents for follow up appointment.    Status/Progress: Based on the examination today, the patient's problem(s) is/are well controlled when Pt is able to afford medication.  New problems have not been presented today.   Co-morbidities are not complicating management of the primary condition.  There are no active rule-out diagnoses for this patient at this time.     Risk Parameters:  Patient reports no suicidal ideation  Patient reports no homicidal ideation  Patient reports no self-injurious behavior  Patient reports no violent behavior    DIAGNOSES:    ICD-10-CM ICD-9-CM   1. Attention deficit hyperactivity disorder (ADHD), predominantly inattentive type  F90.0 314.00       PLAN:  Continue dextroamphetamine-amphetamine XR 20-40 mg q.a.m. for ADHD-IT (40 mg q.a.m. on days he works; 20 mg q.a.m. on weekends)  Pt informed about [employer] coupons in order to afford medication      RETURN TO CLINIC:   3 months      HOLLY Bledsoe, PMHNP-BC      30 minutes of total time spent on the encounter, which includes face to face time and non-face to face time preparing to see the patient (eg. review of tests), obtaining and/or reviewing separately obtained history, documenting clinical information in the electronic health record, independently interpreting results (not separately reported), and communicating results to the patient/family/caregiver, or care coordination (not separately reported).     At this time there are no indications the patient represents an imminent danger to either themselves or others; will continue to manage treatment in the outpatient setting.    I discussed the patient's care with the patient including benefits, alternatives, possible adverse effects of the treatment plan; including the potential for metabolic complications, major organ dysfunction, black box warnings, and  "contraindications. The opportunity was given for questions/clarification, and after this discussion the above treatment plan was devised through shared decision making. The patient voiced their understanding of the diagnoses and treatments listed above and agreed to the treatment plan. Follow up plan was reviewed with the patient. The patient was advised to call to report any worsening of symptoms or problems with medication.    Supportive therapy and psychoeducation provided. Patient has been given crisis information including Suicide and Crisis Lifeline (call or text: 464). Patient also given instructions to go to the nearest ER or call 911 if unable to remain safe or if the Pt develops thoughts of harming self or others.    [facility]: Reviewed today to detect potential controlled substance misuse, diversion, excessive prescribing, or multiple providers prescribing controlled substances. The patients report was deemed appropriate without new medications of concern prescribed by other providers.    Documentation entered by me for this encounter may have been done in part using [employer] voice recognition transcription software. Garbled syntax, mangled pronouns, and other bizarre constructions may be attributed to that software system. Although I have made an effort to ensure accuracy, "sound like" errors may exist and should be interpreted in context.    "

## 2024-03-04 DIAGNOSIS — F90.0 ATTENTION DEFICIT HYPERACTIVITY DISORDER (ADHD), PREDOMINANTLY INATTENTIVE TYPE: ICD-10-CM

## 2024-03-04 RX ORDER — DEXTROAMPHETAMINE SACCHARATE, AMPHETAMINE ASPARTATE MONOHYDRATE, DEXTROAMPHETAMINE SULFATE AND AMPHETAMINE SULFATE 5; 5; 5; 5 MG/1; MG/1; MG/1; MG/1
40 CAPSULE, EXTENDED RELEASE ORAL EVERY MORNING
Qty: 60 CAPSULE | Refills: 0 | Status: SHIPPED | OUTPATIENT
Start: 2024-03-04 | End: 2024-04-04

## 2024-04-04 PROBLEM — K52.9 CHRONIC DIARRHEA: Status: ACTIVE | Noted: 2024-04-04

## 2024-04-04 PROBLEM — K64.9 HEMORRHOIDS: Status: ACTIVE | Noted: 2024-04-04

## 2024-06-26 ENCOUNTER — OFFICE VISIT (OUTPATIENT)
Dept: PSYCHIATRY | Facility: CLINIC | Age: 29
End: 2024-06-26
Payer: COMMERCIAL

## 2024-06-26 DIAGNOSIS — F90.0 ATTENTION DEFICIT HYPERACTIVITY DISORDER (ADHD), PREDOMINANTLY INATTENTIVE TYPE: Primary | ICD-10-CM

## 2024-06-26 PROCEDURE — 99213 OFFICE O/P EST LOW 20 MIN: CPT | Mod: 95,,,

## 2024-06-26 PROCEDURE — 4010F ACE/ARB THERAPY RXD/TAKEN: CPT | Mod: CPTII,95,,

## 2024-06-26 PROCEDURE — 1159F MED LIST DOCD IN RCRD: CPT | Mod: CPTII,95,,

## 2024-06-26 PROCEDURE — 1160F RVW MEDS BY RX/DR IN RCRD: CPT | Mod: CPTII,95,,

## 2024-06-26 RX ORDER — DEXTROAMPHETAMINE SACCHARATE, AMPHETAMINE ASPARTATE MONOHYDRATE, DEXTROAMPHETAMINE SULFATE AND AMPHETAMINE SULFATE 5; 5; 5; 5 MG/1; MG/1; MG/1; MG/1
40 CAPSULE, EXTENDED RELEASE ORAL EVERY MORNING
Qty: 60 CAPSULE | Refills: 0 | Status: SHIPPED | OUTPATIENT
Start: 2024-06-26 | End: 2024-07-26

## 2024-06-26 RX ORDER — DEXTROAMPHETAMINE SACCHARATE, AMPHETAMINE ASPARTATE MONOHYDRATE, DEXTROAMPHETAMINE SULFATE AND AMPHETAMINE SULFATE 5; 5; 5; 5 MG/1; MG/1; MG/1; MG/1
40 CAPSULE, EXTENDED RELEASE ORAL EVERY MORNING
Qty: 60 CAPSULE | Refills: 0 | Status: SHIPPED | OUTPATIENT
Start: 2024-07-26 | End: 2024-08-25

## 2024-06-26 RX ORDER — DEXTROAMPHETAMINE SACCHARATE, AMPHETAMINE ASPARTATE MONOHYDRATE, DEXTROAMPHETAMINE SULFATE AND AMPHETAMINE SULFATE 5; 5; 5; 5 MG/1; MG/1; MG/1; MG/1
40 CAPSULE, EXTENDED RELEASE ORAL EVERY MORNING
Qty: 60 CAPSULE | Refills: 0 | Status: SHIPPED | OUTPATIENT
Start: 2024-08-26 | End: 2024-09-25

## 2024-06-26 NOTE — PROGRESS NOTES
OUTPATIENT PSYCHIATRY FOLLOW UP VISIT    Encounter Date: 6/26/2024    Clinical Status of Patient:  Outpatient (Virtual)  The patient location is: [address] [place] [address] [address]   [address]  [place] [address]  The patient phone number is: [phone]   Visit type: Virtual visit with synchronous audio and video  Each patient to whom he or she provides medical services by telemedicine is:  (1) informed of the relationship between the practitioner and patient and the respective role of any other health care provider with respect to management of the patient; and (2) notified that he or she may decline to receive medical services by telemedicine and may withdraw from such care at any time.    Chief Complaint:  Ion Garvey is a 29 y.o. male who presents today for follow-up.  Met with patient.      HISTORY OF PRESENTING ILLNESS:  Ion Garvey is a 29 y.o. male with history of ADHD-IT who presents for follow up appointment.      Plan at last appointment:  Continue dextroamphetamine-amphetamine XR 20-40 mg q.a.m. for ADHD-IT (40 mg q.a.m. on days he works; 20 mg q.a.m. on weekends)  Pt informed about [employer] coupons in order to afford medication    Psychotropic medication history:   bupropion, hydroxyzine      INTERVAL HISTORY:    Pt reports work is going well, though he does report some stress due to the demands placed on him by corporate.  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      Last visit was 5 months ago in January.  Patient reports he has been out of dextroamphetamine-amphetamine XR for over a month.  He notes he has taken a break from the medication due to his financial situation.  He reports difficulty completing all of his necessary tasks without medication.  He also notes difficulty remembering things that have been told to him.  He has a whiteboard in his office to help him remember tasks that need to be done. Abiilty to focus at work has been poor. Thinks about things  "that he needs to do at home, but when he gets home he is unable to do the things that he planned.  He states his a better listener when taking medication notes he talks much more when he does not take it.  Before starting dextroamphetamine-amphetamine, I was reacting without thinking.  I would just spiral, I couldn't stop hyperfixating on something bad. I could see myself frustrating people."     Continues antihypertensives.  Had annual will check with PCP in April.    No interval episodes with symptoms consistent with rasheeda or hypomania.  Denied interval or current suicidal/homicidal thoughts, intent, or plan or NSSI.  Denied other questions and concerns.  Patient reports feeling stable and wishing to continue current management unchanged.    Medication side effects: None Pt denies cardiovascular events including increased heart rate, palpitations, chest pain, dizziness, lightheadedness, or syncopal episodes. Pt denies A/V hallucinations or behavioral effects. Pt denies anorexia, decreased appetite, xerostomia, headache, insomnia, or digital changes.   Medication adherence: yes    PSYCHIATRIC REVIEW OF SYSTEMS:  Is patient experiencing or having changes in:  Trouble with sleep:  sleeping well. Recently got a sunrise lamp, which "has been awesome"  Appetite changes:  no  Weight changes:  no  Lack of energy:  +fatigue  Anhedonia:  no  Somatic symptoms:  no  Anxiety/panic:  no  Irritability: no  Guilty/hopeless:  no  Concentration: difficulty at baseline   Racing thoughts: no  Impulsive behaviors: no  Paranoia/AVH: no  Self-injurious behavior/risky behavior:  no  Any drugs:  no  Alcohol:  no      MEDICAL REVIEW OF SYSTEMS:   Pain: Denies any significant chronic or acute pain.  Constitutional: Denies fever or change in appetite.  Cardiovascular: Denies chest pain or exertional dyspnea.  Respiratory: Denies cough or orthopnea.   GI: Denies abdominal pain, N/V  Neurological: Denies tremor, seizure, or focal " "weakness.  Psychiatric: See HPI above.    PAST PSYCHIATRIC, MEDICAL, AND SOCIAL HISTORY REVIEWED  The patient's past medical, family and social history have been reviewed and updated as appropriate within the electronic medical record - see encounter notes.    PAST MEDICAL HISTORY:   Past Medical History:   Diagnosis Date    Depression     Head trauma/Loss of consciousness: denies  Seizures: denies     PAST PSYCHIATRIC HISTORY:  First psych contact: in 2016 for anxiety and depression, [facility]    Prior hospitalizations: denies  Prior suicide attempts or self-harm: denies  Prior diagnosis: ADHD, anxiety, and depression  Prior psychotherapy: denies    MEDICATIONS:    Current Outpatient Medications:     dextroamphetamine-amphetamine (ADDERALL XR) 20 MG 24 hr capsule, Take 2 capsules (40 mg total) by mouth every morning., Disp: 60 capsule, Rfl: 0    [START ON 7/26/2024] dextroamphetamine-amphetamine (ADDERALL XR) 20 MG 24 hr capsule, Take 2 capsules (40 mg total) by mouth every morning., Disp: 60 capsule, Rfl: 0    [START ON 8/26/2024] dextroamphetamine-amphetamine (ADDERALL XR) 20 MG 24 hr capsule, Take 2 capsules (40 mg total) by mouth every morning., Disp: 60 capsule, Rfl: 0    olmesartan (BENICAR) 20 MG tablet, Take 1 tablet (20 mg total) by mouth once daily., Disp: 90 tablet, Rfl: 0    ALLERGIES:  Review of patient's allergies indicates:   Allergen Reactions    Cephalexin     Sulfamethoxazole-trimethoprim        EXAM:  Constitutional  Vitals:  Most recent vital signs were reviewed.   Last 3 sets of VS:      1/20/2023     1:54 PM 8/10/2023    12:10 PM 4/4/2024     2:16 PM   Vitals - 1 value per visit   SYSTOLIC 116 130 138   DIASTOLIC 78 87 70   Pulse 80 76 110   Temp 97.5 °F (36.4 °C)  97.6 °F (36.4 °C)   Resp 14  16   SPO2 95 %  96 %   Weight (lb) 141.2  135   Weight (kg) 64.048  61.236   Height 5' 9" (1.753 m)  5' 9" (1.753 m)   BMI (Calculated) 20.8  19.9   Pain Score   Zero      General:  " "unremarkable, age appropriate     Musculoskeletal  Muscle Strength/Tone:  No tremors appreciated   Gait & Station:  Unable to assess, Pt seated during virtual visit     Psychiatric  Speech:  no latency; no press   Mood & Affect:  euthymic  congruent and appropriate   Thought Process:  normal and logical   Associations:  intact   Thought Content:  normal, no suicidality, no homicidality, delusions, or paranoia   Insight:  intact   Judgement: behavior is adequate to circumstances   Orientation:  grossly intact   Memory: intact for content of interview   Language: grossly intact   Attention Span & Concentration:  Intact to interview   Fund of Knowledge:  Not formally tested     SUICIDE RISK ASSESSMENT:  Protective factors: age, gender, no prior attempts, no prior hospitalizations, no ongoing substance abuse, no psychosis, denies SI/intent/plan, seeking treatment, access to treatment, future oriented, good primary support  Risks: hx anxiety and depression, gender, access to firearms  Patient is a low immediate and long-term risk considering risk factors.     RELEVANT LABS/STUDIES:    Lab Results   Component Value Date    WBC 8.2 04/06/2021    HGB 16.4 04/06/2021    HCT 48.9 04/06/2021    MCV 93.3 04/06/2021     04/06/2021     BMP  Lab Results   Component Value Date     02/12/2020    K 4.5 02/12/2020     02/12/2020    CO2 27 02/12/2020    BUN 13 02/12/2020    CREATININE 0.96 02/12/2020    CALCIUM 10.2 02/12/2020    ESTGFRAFRICA 125 02/12/2020    EGFRNONAA 108 02/12/2020     Lab Results   Component Value Date    ALT 13 02/12/2020    AST 13 02/12/2020    ALKPHOS 77 02/12/2020    BILITOT 1.1 02/12/2020     Lab Results   Component Value Date    TSH 0.85 02/12/2020     No results found for: "LABA1C", "HGBA1C"  Lab Results   Component Value Date    CHOL 116 02/12/2020    TRIG 73 02/12/2020    HDL 64 02/12/2020    LDLCALC 37 02/12/2020    CHOLHDL 1.8 02/12/2020       IMPRESSION:    Ion Garvey is a " 29 y.o. male with history of ADHD-IT who presents for follow up appointment.    Status/Progress: Based on the examination today, the patient's problem(s) is/are well controlled when Pt is able to afford medication.  New problems have not been presented today.   Co-morbidities are not complicating management of the primary condition.  There are no active rule-out diagnoses for this patient at this time.     Risk Parameters:  Patient reports no suicidal ideation  Patient reports no homicidal ideation  Patient reports no self-injurious behavior  Patient reports no violent behavior    DIAGNOSES:    ICD-10-CM ICD-9-CM   1. Attention deficit hyperactivity disorder (ADHD), predominantly inattentive type  F90.0 314.00       PLAN:  Continue dextroamphetamine-amphetamine XR 20-40 mg q.a.m. for ADHD-IT (40 mg q.a.m. on days he works; 20 mg q.a.m. on weekends)  Pt reminded about [employer] coupons in order to afford medication      RETURN TO CLINIC:   3 months      HOLLY Bledsoe, PMHNP-BC      30 minutes of total time spent on the encounter, which includes face to face time and non-face to face time preparing to see the patient (eg. review of tests), obtaining and/or reviewing separately obtained history, documenting clinical information in the electronic health record, independently interpreting results (not separately reported), and communicating results to the patient/family/caregiver, or care coordination (not separately reported).     At this time there are no indications the patient represents an imminent danger to either themselves or others; will continue to manage treatment in the outpatient setting.    I discussed the patient's care with the patient including benefits, alternatives, possible adverse effects of the treatment plan; including the potential for metabolic complications, major organ dysfunction, black box warnings, and contraindications. The opportunity was given for questions/clarification, and  "after this discussion the above treatment plan was devised through shared decision making. The patient voiced their understanding of the diagnoses and treatments listed above and agreed to the treatment plan. Follow up plan was reviewed with the patient. The patient was advised to call to report any worsening of symptoms or problems with medication.    Supportive therapy and psychoeducation provided. Patient has been given crisis information including Suicide and Crisis Lifeline (call or text: 760). Patient also given instructions to go to the nearest ER or call 911 if unable to remain safe or if the Pt develops thoughts of harming self or others.    [facility]: Reviewed today to detect potential controlled substance misuse, diversion, excessive prescribing, or multiple providers prescribing controlled substances. The patients report was deemed appropriate without new medications of concern prescribed by other providers.    Documentation entered by me for this encounter may have been done in part using [employer] voice recognition transcription software. Garbled syntax, mangled pronouns, and other bizarre constructions may be attributed to that software system. Although I have made an effort to ensure accuracy, "sound like" errors may exist and should be interpreted in context.      "

## 2024-09-16 DIAGNOSIS — F90.0 ATTENTION DEFICIT HYPERACTIVITY DISORDER (ADHD), PREDOMINANTLY INATTENTIVE TYPE: ICD-10-CM

## 2024-09-16 RX ORDER — DEXTROAMPHETAMINE SACCHARATE, AMPHETAMINE ASPARTATE MONOHYDRATE, DEXTROAMPHETAMINE SULFATE AND AMPHETAMINE SULFATE 5; 5; 5; 5 MG/1; MG/1; MG/1; MG/1
40 CAPSULE, EXTENDED RELEASE ORAL EVERY MORNING
Qty: 60 CAPSULE | Refills: 0 | Status: SHIPPED | OUTPATIENT
Start: 2024-09-16 | End: 2024-10-16

## 2024-09-23 ENCOUNTER — OFFICE VISIT (OUTPATIENT)
Dept: PSYCHIATRY | Facility: CLINIC | Age: 29
End: 2024-09-23
Payer: COMMERCIAL

## 2024-09-23 DIAGNOSIS — F90.0 ATTENTION DEFICIT HYPERACTIVITY DISORDER (ADHD), PREDOMINANTLY INATTENTIVE TYPE: ICD-10-CM

## 2024-09-23 PROCEDURE — 4010F ACE/ARB THERAPY RXD/TAKEN: CPT | Mod: CPTII,95,,

## 2024-09-23 PROCEDURE — 1160F RVW MEDS BY RX/DR IN RCRD: CPT | Mod: CPTII,95,,

## 2024-09-23 PROCEDURE — G2211 COMPLEX E/M VISIT ADD ON: HCPCS | Mod: 95,,,

## 2024-09-23 PROCEDURE — 1159F MED LIST DOCD IN RCRD: CPT | Mod: CPTII,95,,

## 2024-09-23 PROCEDURE — 99214 OFFICE O/P EST MOD 30 MIN: CPT | Mod: 95,,,

## 2024-09-23 RX ORDER — DEXTROAMPHETAMINE SACCHARATE, AMPHETAMINE ASPARTATE MONOHYDRATE, DEXTROAMPHETAMINE SULFATE AND AMPHETAMINE SULFATE 5; 5; 5; 5 MG/1; MG/1; MG/1; MG/1
40 CAPSULE, EXTENDED RELEASE ORAL EVERY MORNING
Qty: 60 CAPSULE | Refills: 0 | Status: SHIPPED | OUTPATIENT
Start: 2024-12-13 | End: 2025-01-12

## 2024-09-23 RX ORDER — DEXTROAMPHETAMINE SACCHARATE, AMPHETAMINE ASPARTATE MONOHYDRATE, DEXTROAMPHETAMINE SULFATE AND AMPHETAMINE SULFATE 5; 5; 5; 5 MG/1; MG/1; MG/1; MG/1
40 CAPSULE, EXTENDED RELEASE ORAL EVERY MORNING
Qty: 60 CAPSULE | Refills: 0 | Status: SHIPPED | OUTPATIENT
Start: 2024-10-16 | End: 2024-11-15

## 2024-09-23 RX ORDER — DEXTROAMPHETAMINE SACCHARATE, AMPHETAMINE ASPARTATE MONOHYDRATE, DEXTROAMPHETAMINE SULFATE AND AMPHETAMINE SULFATE 5; 5; 5; 5 MG/1; MG/1; MG/1; MG/1
40 CAPSULE, EXTENDED RELEASE ORAL EVERY MORNING
Qty: 60 CAPSULE | Refills: 0 | Status: SHIPPED | OUTPATIENT
Start: 2024-11-15 | End: 2024-12-15

## 2024-09-23 NOTE — PROGRESS NOTES
OUTPATIENT PSYCHIATRY FOLLOW UP VISIT    Encounter Date: 9/23/2024    Clinical Status of Patient:  Outpatient (Virtual)  The patient location is: [address] [place] [address] Rd   [address]  [place] [id number]  The patient phone number is: [phone]   Visit type: Virtual visit with synchronous audio and video  Each patient to whom he or she provides medical services by telemedicine is:  (1) informed of the relationship between the practitioner and patient and the respective role of any other health care provider with respect to management of the patient; and (2) notified that he or she may decline to receive medical services by telemedicine and may withdraw from such care at any time.    Chief Complaint:  Ion Garvey is a 29 y.o. male who presents today for follow-up.  Met with patient.      HISTORY OF PRESENTING ILLNESS:  Ion Garvey is a 29 y.o. male with history of ADHD-IT who presents for follow up appointment.      Plan at last appointment:  Continue dextroamphetamine-amphetamine XR 20-40 mg q.a.m. for ADHD-IT (40 mg q.a.m. on days he works; 20 mg q.a.m. on weekends)  Pt reminded about [employer] coupons in order to afford medication    Psychotropic medication history:   bupropion, hydroxyzine      INTERVAL HISTORY:    Continues dextroamphetamine-amphetamine XR with good control of ADHD symptoms. He takes 40 mg q.a.m. on days he works and 20 mg q.a.m. on weekends.     He continues benicar 20 mg daily, with good control of HTN. He checks his BP at home regularly.    Mood continues to be stable.     Is patient experiencing or having changes in:  Trouble with sleep:  sleeping well  Appetite changes:  appetite is decreased when he takes dextroamphetamine-amphetamine, but schedules meals and snacks  Weight changes:  no  Lack of energy:  no  Anhedonia:  no  Somatic symptoms:  no  Anxiety/panic:  no  Irritability: no  Guilty/hopeless:  no  Racing thoughts: no  Impulsive behaviors: no  Paranoia/AVH:  no  Self-injurious behavior/risky behavior:  no  Any drugs:  no  Alcohol:  no    No interval episodes with symptoms consistent with rasheeda or hypomania.  Denied interval or current suicidal/homicidal thoughts, intent, or plan or NSSI.  Denied other questions and concerns.  Patient reports feeling stable and wishing to continue current management unchanged.    Medication side effects: None Pt denies cardiovascular events including increased heart rate, palpitations, chest pain, dizziness, lightheadedness, or syncopal episodes. Pt denies A/V hallucinations or behavioral effects. Pt denies anorexia, decreased appetite, xerostomia, headache, insomnia, or digital changes.   Medication adherence: yes    MEDICAL REVIEW OF SYSTEMS:   Pain: Denies any significant chronic or acute pain.  Constitutional: Denies fever or change in appetite.  Cardiovascular: Denies chest pain or exertional dyspnea.  Respiratory: Denies cough or orthopnea.   GI: Denies abdominal pain, N/V  Neurological: Denies tremor, seizure, or focal weakness.  Psychiatric: See HPI above.    PAST PSYCHIATRIC, MEDICAL, AND SOCIAL HISTORY REVIEWED  The patient's past medical, family and social history have been reviewed and updated as appropriate within the electronic medical record - see encounter notes.    PAST MEDICAL HISTORY:   Past Medical History:   Diagnosis Date    Depression     Head trauma/Loss of consciousness: denies  Seizures: denies     PAST PSYCHIATRIC HISTORY:  First psych contact: in 2016 for anxiety and depression, [facility]    Prior hospitalizations: denies  Prior suicide attempts or self-harm: denies  Prior diagnosis: ADHD, anxiety, and depression  Prior psychotherapy: denies    EXAM:  Constitutional  Vitals:  Most recent vital signs were reviewed.   Last 3 sets of VS:      1/20/2023     1:54 PM 8/10/2023    12:10 PM 4/4/2024     2:16 PM   Vitals - 1 value per visit   SYSTOLIC 116 130 138   DIASTOLIC 78 87 70   Pulse 80 76 110   Temp  "97.5 °F (36.4 °C)  97.6 °F (36.4 °C)   Resp 14  16   SPO2 95 %  96 %   Weight (lb) 141.2  135   Weight (kg) 64.048  61.236   Height 5' 9" (1.753 m)  5' 9" (1.753 m)   BMI (Calculated) 20.8  19.9   Pain Score   Zero      General:  unremarkable, age appropriate     Musculoskeletal  Muscle Strength/Tone:  No tremors appreciated   Gait & Station:  Pt seated during virtual visit     Psychiatric  Speech:  no latency; no press   Mood & Affect:  euthymic  congruent and appropriate   Thought Process:  normal and logical   Associations:  intact   Thought Content:  normal, no suicidality, no homicidality, delusions, or paranoia   Insight:  intact   Judgement: behavior is adequate to circumstances   Orientation:  grossly intact   Memory: intact for content of interview   Language: grossly intact   Attention Span & Concentration:  Intact to interview   Fund of Knowledge:  Not formally tested     SUICIDE RISK ASSESSMENT:  Protective factors: age, gender, no prior attempts, no prior hospitalizations, no ongoing substance abuse, no psychosis, denies SI/intent/plan, seeking treatment, access to treatment, future oriented, good primary support  Risks: hx anxiety and depression, gender, access to firearms  Patient is a low immediate and long-term risk considering risk factors.     RELEVANT LABS/STUDIES:    Lab Results   Component Value Date    WBC 8.2 04/06/2021    HGB 16.4 04/06/2021    HCT 48.9 04/06/2021    MCV 93.3 04/06/2021     04/06/2021     BMP  Lab Results   Component Value Date     02/12/2020    K 4.5 02/12/2020     02/12/2020    CO2 27 02/12/2020    BUN 13 02/12/2020    CREATININE 0.96 02/12/2020    CALCIUM 10.2 02/12/2020    ESTGFRAFRICA 125 02/12/2020    EGFRNONAA 108 02/12/2020     Lab Results   Component Value Date    ALT 13 02/12/2020    AST 13 02/12/2020    ALKPHOS 77 02/12/2020    BILITOT 1.1 02/12/2020     Lab Results   Component Value Date    TSH 0.85 02/12/2020     No results found for: " ""LABA1C", "HGBA1C"  Lab Results   Component Value Date    CHOL 116 02/12/2020    TRIG 73 02/12/2020    HDL 64 02/12/2020    LDLCALC 37 02/12/2020    CHOLHDL 1.8 02/12/2020       IMPRESSION:    Ion Garvey is a 29 y.o. male with history of ADHD-IT who presents for follow up appointment.    Status/Progress: Based on the examination today, the patient's problem(s) is/are well controlled when Pt is able to afford medication.  New problems have not been presented today.   Co-morbidities are not complicating management of the primary condition.  There are no active rule-out diagnoses for this patient at this time.     Risk Parameters:  Patient reports no suicidal ideation  Patient reports no homicidal ideation  Patient reports no self-injurious behavior  Patient reports no violent behavior    DIAGNOSES:    ICD-10-CM ICD-9-CM   1. Attention deficit hyperactivity disorder (ADHD), predominantly inattentive type  F90.0 314.00       PLAN:  Continue dextroamphetamine-amphetamine XR 20-40 mg q.a.m. for ADHD-IT (40 mg q.a.m. on days he works; 20 mg q.a.m. on weekends)  Pt reminded about [employer] coupons in order to afford medication      RETURN TO CLINIC:   3 months      HOLLY Bledsoe, PMHNP-BC      30 minutes of total time spent on the encounter, which includes face to face time and non-face to face time preparing to see the patient (eg. review of tests), obtaining and/or reviewing separately obtained history, documenting clinical information in the electronic health record, independently interpreting results (not separately reported), and communicating results to the patient/family/caregiver, or care coordination (not separately reported).     Visit today included managing the longitudinal care of the patient due to the serious and/or complex managed problem(s) ADHD.    At this time there are no indications the patient represents an imminent danger to either themselves or others; will continue to manage " "treatment in the outpatient setting.    I discussed the patient's care with the patient including benefits, alternatives, possible adverse effects of the treatment plan; including the potential for metabolic complications, major organ dysfunction, black box warnings, and contraindications. The opportunity was given for questions/clarification, and after this discussion the above treatment plan was devised through shared decision making. The patient voiced their understanding of the diagnoses and treatments listed above and agreed to the treatment plan. Follow up plan was reviewed with the patient. The patient was advised to call to report any worsening of symptoms or problems with medication.    Supportive therapy and psychoeducation provided. Patient has been given crisis information including Suicide and Crisis Lifeline (call or text: 587). Patient also given instructions to go to the nearest ER or call 911 if unable to remain safe or if the Pt develops thoughts of harming self or others.    [facility]: Reviewed today to detect potential controlled substance misuse, diversion, excessive prescribing, or multiple providers prescribing controlled substances. The patients report was deemed appropriate without new medications of concern prescribed by other providers.    Documentation entered by me for this encounter may have been done in part using [employer] voice recognition transcription software. Garbled syntax, mangled pronouns, and other bizarre constructions may be attributed to that software system. Although I have made an effort to ensure accuracy, "sound like" errors may exist and should be interpreted in context.  "

## 2024-11-15 DIAGNOSIS — F90.0 ATTENTION DEFICIT HYPERACTIVITY DISORDER (ADHD), PREDOMINANTLY INATTENTIVE TYPE: ICD-10-CM

## 2024-11-15 NOTE — TELEPHONE ENCOUNTER
Last ordered: 1 month ago (9/23/2024) by Selin Dee NP     Nov none - due for a in person 3 month follow up in December   Lov 9/23/24

## 2024-11-18 RX ORDER — DEXTROAMPHETAMINE SACCHARATE, AMPHETAMINE ASPARTATE MONOHYDRATE, DEXTROAMPHETAMINE SULFATE AND AMPHETAMINE SULFATE 5; 5; 5; 5 MG/1; MG/1; MG/1; MG/1
40 CAPSULE, EXTENDED RELEASE ORAL EVERY MORNING
Qty: 60 CAPSULE | Refills: 0 | Status: SHIPPED | OUTPATIENT
Start: 2024-11-18 | End: 2024-12-18

## 2025-01-13 ENCOUNTER — TELEPHONE (OUTPATIENT)
Dept: PSYCHIATRY | Facility: CLINIC | Age: 30
End: 2025-01-13
Payer: COMMERCIAL

## 2025-01-13 NOTE — TELEPHONE ENCOUNTER
Patient called wanting to schedule an appointment with Selin to refill his adderall 20 mg. He is going to be going away on a business trip. He wants to know if Selin would refill it for him. Her next available isn't until February. He is out of his medicine.

## 2025-01-14 DIAGNOSIS — F90.0 ATTENTION DEFICIT HYPERACTIVITY DISORDER (ADHD), PREDOMINANTLY INATTENTIVE TYPE: ICD-10-CM

## 2025-01-14 RX ORDER — DEXTROAMPHETAMINE SACCHARATE, AMPHETAMINE ASPARTATE MONOHYDRATE, DEXTROAMPHETAMINE SULFATE AND AMPHETAMINE SULFATE 5; 5; 5; 5 MG/1; MG/1; MG/1; MG/1
40 CAPSULE, EXTENDED RELEASE ORAL EVERY MORNING
Qty: 60 CAPSULE | Refills: 0 | Status: SHIPPED | OUTPATIENT
Start: 2025-01-14 | End: 2025-02-13

## 2025-02-12 ENCOUNTER — OFFICE VISIT (OUTPATIENT)
Dept: PSYCHIATRY | Facility: CLINIC | Age: 30
End: 2025-02-12
Payer: COMMERCIAL

## 2025-02-12 VITALS
SYSTOLIC BLOOD PRESSURE: 124 MMHG | WEIGHT: 141.19 LBS | HEART RATE: 60 BPM | BODY MASS INDEX: 20.91 KG/M2 | DIASTOLIC BLOOD PRESSURE: 86 MMHG | HEIGHT: 69 IN

## 2025-02-12 DIAGNOSIS — F90.0 ATTENTION DEFICIT HYPERACTIVITY DISORDER (ADHD), PREDOMINANTLY INATTENTIVE TYPE: Primary | ICD-10-CM

## 2025-02-12 PROCEDURE — 3074F SYST BP LT 130 MM HG: CPT | Mod: CPTII,S$GLB,,

## 2025-02-12 PROCEDURE — 3008F BODY MASS INDEX DOCD: CPT | Mod: CPTII,S$GLB,,

## 2025-02-12 PROCEDURE — 99999 PR PBB SHADOW E&M-EST. PATIENT-LVL III: CPT | Mod: PBBFAC,,,

## 2025-02-12 PROCEDURE — 99213 OFFICE O/P EST LOW 20 MIN: CPT | Mod: S$GLB,,,

## 2025-02-12 PROCEDURE — 3079F DIAST BP 80-89 MM HG: CPT | Mod: CPTII,S$GLB,,

## 2025-02-12 PROCEDURE — 1160F RVW MEDS BY RX/DR IN RCRD: CPT | Mod: CPTII,S$GLB,,

## 2025-02-12 PROCEDURE — G2211 COMPLEX E/M VISIT ADD ON: HCPCS | Mod: S$GLB,,,

## 2025-02-12 PROCEDURE — 1159F MED LIST DOCD IN RCRD: CPT | Mod: CPTII,S$GLB,,

## 2025-02-12 RX ORDER — DEXTROAMPHETAMINE SACCHARATE, AMPHETAMINE ASPARTATE MONOHYDRATE, DEXTROAMPHETAMINE SULFATE AND AMPHETAMINE SULFATE 5; 5; 5; 5 MG/1; MG/1; MG/1; MG/1
40 CAPSULE, EXTENDED RELEASE ORAL EVERY MORNING
Qty: 60 CAPSULE | Refills: 0 | Status: SHIPPED | OUTPATIENT
Start: 2025-02-12 | End: 2025-03-14

## 2025-02-12 RX ORDER — DEXTROAMPHETAMINE SACCHARATE, AMPHETAMINE ASPARTATE MONOHYDRATE, DEXTROAMPHETAMINE SULFATE AND AMPHETAMINE SULFATE 5; 5; 5; 5 MG/1; MG/1; MG/1; MG/1
40 CAPSULE, EXTENDED RELEASE ORAL EVERY MORNING
Qty: 60 CAPSULE | Refills: 0 | Status: SHIPPED | OUTPATIENT
Start: 2025-04-13 | End: 2025-05-13

## 2025-02-12 RX ORDER — DEXTROAMPHETAMINE SACCHARATE, AMPHETAMINE ASPARTATE MONOHYDRATE, DEXTROAMPHETAMINE SULFATE AND AMPHETAMINE SULFATE 5; 5; 5; 5 MG/1; MG/1; MG/1; MG/1
40 CAPSULE, EXTENDED RELEASE ORAL EVERY MORNING
Qty: 60 CAPSULE | Refills: 0 | Status: SHIPPED | OUTPATIENT
Start: 2025-03-14 | End: 2025-04-13

## 2025-02-12 NOTE — PROGRESS NOTES
"OUTPATIENT PSYCHIATRY FOLLOW UP VISIT    Encounter Date: 2/12/2025    Clinical Status of Patient:  Outpatient (Ambulatory)     Chief Complaint:  Ion Garvey is a 30 y.o. male who presents today for follow-up.  Met with patient.      HISTORY OF PRESENTING ILLNESS:  Ion Garvey is a 30 y.o. male with history of ADHD-IT who presents for follow up appointment.      Plan at last appointment:  Continue dextroamphetamine-amphetamine XR 20-40 mg q.a.m. for ADHD-IT (40 mg q.a.m. on days he works; 20 mg q.a.m. on weekends)  Pt reminded about [employer] coupons in order to afford medication    Psychotropic medication history:   bupropion, hydroxyzine      INTERVAL HISTORY:    Pt reports he is doing well overall. "Everything has been going really well. 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 He takes 40 mg q.a.m. on days he works and 20 mg q.a.m. on weekends. Has had difficulty acquiring medication d/t nationwide shortage. Usually has to wait 1-2 weeks to receive Rx. Was able to use [employer] coupons to decrease the price of medication making it much more affordable for him as he has gone without medication in the past d/t cost.    Recently went to [place] for business. States he was in meetings all day long from 7 AM to 6 PM, "I couldn't have done that without the medicine."    He continues benicar 20 mg daily, with good control of HTN. He checks his BP at home regularly. Planning to follow up with PCP in May.    Mood continues to be stable.     Is patient experiencing or having changes in:  Trouble with sleep:  sleeping well  Appetite changes:  appetite is decreased when he takes dextroamphetamine-amphetamine, but schedules meals and snacks  Weight changes:  no  Lack of energy:  no  Anhedonia:  no  Somatic symptoms:  no  Anxiety/panic:  no  Irritability: no  Guilty/hopeless:  no  Racing thoughts: no  Impulsive " behaviors: no  Paranoia/AVH: no  Self-injurious behavior/risky behavior:  no  Any drugs:  no  Alcohol:  no    No interval episodes with symptoms consistent with rasheeda or hypomania.  Denied interval or current suicidal/homicidal thoughts, intent, or plan or NSSI.  Denied other questions and concerns.  Patient reports feeling stable and wishing to continue current management unchanged.    Medication side effects: None Pt denies cardiovascular events including increased heart rate, palpitations, chest pain, dizziness, lightheadedness, or syncopal episodes. Pt denies A/V hallucinations or behavioral effects. Pt denies anorexia, decreased appetite, xerostomia, headache, insomnia, or digital changes.   Medication adherence: yes    MEDICAL REVIEW OF SYSTEMS:   Pain: Denies any significant chronic or acute pain.  Constitutional: Denies fever or change in appetite.  Cardiovascular: Denies chest pain or exertional dyspnea.  Respiratory: Denies cough or orthopnea.   GI: Denies abdominal pain, N/V  Neurological: Denies tremor, seizure, or focal weakness.  Psychiatric: See HPI above.    PAST PSYCHIATRIC, MEDICAL, AND SOCIAL HISTORY REVIEWED  The patient's past medical, family and social history have been reviewed and updated as appropriate within the electronic medical record - see encounter notes.    PAST MEDICAL HISTORY:   Past Medical History:   Diagnosis Date    Depression     Head trauma/Loss of consciousness: denies  Seizures: denies     PAST PSYCHIATRIC HISTORY:  First psych contact: in 2016 for anxiety and depression, [facility]    Prior hospitalizations: denies  Prior suicide attempts or self-harm: denies  Prior diagnosis: ADHD, anxiety, and depression  Prior psychotherapy: denies    EXAM:  Constitutional  Vitals:  Most recent vital signs were reviewed.   Last 3 sets of VS:      8/10/2023    12:10 PM 4/4/2024     2:16 PM 2/12/2025     8:34 AM   Vitals - 1 value per visit   SYSTOLIC 130 138 124   DIASTOLIC 87 70  "86   Pulse 76 110 60   Temp  97.6 °F (36.4 °C)    Resp  16    SPO2  96 %    Weight (lb)  135 141.2   Weight (kg)  61.236 64.05   Height  5' 9" (1.753 m) 5' 9" (1.753 m)   BMI (Calculated)  19.9 20.8   Pain Score  Zero Zero      General:  unremarkable, age appropriate     Musculoskeletal  Muscle Strength/Tone:  no tremor or abnormal movements   Gait & Station:  Steady, non-ataxic     Psychiatric  Speech:  no latency; no press   Mood & Affect:  euthymic  congruent and appropriate   Thought Process:  normal and logical   Associations:  intact   Thought Content:  normal, no suicidality, no homicidality, delusions, or paranoia   Insight:  intact   Judgement: behavior is adequate to circumstances   Orientation:  grossly intact   Memory: intact for content of interview   Language: grossly intact   Attention Span & Concentration:  Intact to interview   Fund of Knowledge:  Not formally tested     SUICIDE RISK ASSESSMENT:  Protective factors: age, gender, no prior attempts, no prior hospitalizations, no ongoing substance abuse, no psychosis, denies SI/intent/plan, seeking treatment, access to treatment, future oriented, good primary support  Risks: hx anxiety and depression, gender, access to firearms  Patient is a low immediate and long-term risk considering risk factors.     RELEVANT LABS/STUDIES:    Lab Results   Component Value Date    WBC 8.2 04/06/2021    HGB 16.4 04/06/2021    HCT 48.9 04/06/2021    MCV 93.3 04/06/2021     04/06/2021     BMP  Lab Results   Component Value Date     02/12/2020    K 4.5 02/12/2020     02/12/2020    CO2 27 02/12/2020    BUN 13 02/12/2020    CREATININE 0.96 02/12/2020    CALCIUM 10.2 02/12/2020    ESTGFRAFRICA 125 02/12/2020    EGFRNONAA 108 02/12/2020     Lab Results   Component Value Date    ALT 13 02/12/2020    AST 13 02/12/2020    ALKPHOS 77 02/12/2020    BILITOT 1.1 02/12/2020     Lab Results   Component Value Date    TSH 0.85 02/12/2020     No results found for: " ""LABA1C", "HGBA1C"  Lab Results   Component Value Date    CHOL 116 02/12/2020    TRIG 73 02/12/2020    HDL 64 02/12/2020    LDLCALC 37 02/12/2020    CHOLHDL 1.8 02/12/2020       IMPRESSION:    Ion Garvey is a 30 y.o. male with history of ADHD-IT who presents for follow up appointment.    Status/Progress: Based on the examination today, the patient's problem(s) is/are well controlled when Pt is able to afford medication.  New problems have not been presented today.   Co-morbidities are not complicating management of the primary condition.  There are no active rule-out diagnoses for this patient at this time.     Risk Parameters:  Patient reports no suicidal ideation  Patient reports no homicidal ideation  Patient reports no self-injurious behavior  Patient reports no violent behavior    DIAGNOSES:    ICD-10-CM ICD-9-CM   1. Attention deficit hyperactivity disorder (ADHD), predominantly inattentive type  F90.0 314.00       PLAN:  Continue dextroamphetamine-amphetamine XR 20-40 mg q.a.m. for ADHD-IT (40 mg q.a.m. on days he works; 20 mg q.a.m. on weekends)      RETURN TO CLINIC:   3 months      HOLLY Bledsoe, PMHNP-BC      30 minutes of total time spent on the encounter, which includes face to face time and non-face to face time preparing to see the patient (eg. review of tests), obtaining and/or reviewing separately obtained history, documenting clinical information in the electronic health record, independently interpreting results (not separately reported), and communicating results to the patient/family/caregiver, or care coordination (not separately reported).     Visit today included managing the longitudinal care of the patient due to the serious and/or complex managed problem(s) ADHD.    At this time there are no indications the patient represents an imminent danger to either themselves or others; will continue to manage treatment in the outpatient setting.    I discussed the patient's care " "with the patient including benefits, alternatives, possible adverse effects of the treatment plan; including the potential for metabolic complications, major organ dysfunction, black box warnings, and contraindications. The opportunity was given for questions/clarification, and after this discussion the above treatment plan was devised through shared decision making. The patient voiced their understanding of the diagnoses and treatments listed above and agreed to the treatment plan. Follow up plan was reviewed with the patient. The patient was advised to call to report any worsening of symptoms or problems with medication.    Supportive therapy and psychoeducation provided. Patient has been given crisis information including Suicide and Crisis Lifeline (call or text: 536). Patient also given instructions to go to the nearest ER or call 911 if unable to remain safe or if the Pt develops thoughts of harming self or others.    [facility]: Reviewed today to detect potential controlled substance misuse, diversion, excessive prescribing, or multiple providers prescribing controlled substances. The patients report was deemed appropriate without new medications of concern prescribed by other providers.    Documentation entered by me for this encounter may have been done in part using [employer] voice recognition transcription software. Garbled syntax, mangled pronouns, and other bizarre constructions may be attributed to that software system. "Sound like" errors may exist and should be interpreted in context.    "

## 2025-05-09 DIAGNOSIS — F90.0 ATTENTION DEFICIT HYPERACTIVITY DISORDER (ADHD), PREDOMINANTLY INATTENTIVE TYPE: ICD-10-CM

## 2025-05-12 RX ORDER — DEXTROAMPHETAMINE SACCHARATE, AMPHETAMINE ASPARTATE MONOHYDRATE, DEXTROAMPHETAMINE SULFATE AND AMPHETAMINE SULFATE 5; 5; 5; 5 MG/1; MG/1; MG/1; MG/1
40 CAPSULE, EXTENDED RELEASE ORAL EVERY MORNING
Qty: 60 CAPSULE | Refills: 0 | Status: SHIPPED | OUTPATIENT
Start: 2025-05-12 | End: 2025-06-11

## 2025-05-26 ENCOUNTER — OFFICE VISIT (OUTPATIENT)
Dept: PSYCHIATRY | Facility: CLINIC | Age: 30
End: 2025-05-26
Payer: COMMERCIAL

## 2025-05-26 DIAGNOSIS — F90.0 ATTENTION DEFICIT HYPERACTIVITY DISORDER (ADHD), PREDOMINANTLY INATTENTIVE TYPE: Primary | ICD-10-CM

## 2025-05-26 PROCEDURE — 98005 SYNCH AUDIO-VIDEO EST LOW 20: CPT | Mod: 95,,,

## 2025-05-26 PROCEDURE — 1159F MED LIST DOCD IN RCRD: CPT | Mod: CPTII,95,,

## 2025-05-26 PROCEDURE — 1160F RVW MEDS BY RX/DR IN RCRD: CPT | Mod: CPTII,95,,

## 2025-05-26 PROCEDURE — G2211 COMPLEX E/M VISIT ADD ON: HCPCS | Mod: 95,,,

## 2025-06-25 DIAGNOSIS — F90.0 ATTENTION DEFICIT HYPERACTIVITY DISORDER (ADHD), PREDOMINANTLY INATTENTIVE TYPE: ICD-10-CM

## 2025-06-25 RX ORDER — DEXTROAMPHETAMINE SACCHARATE, AMPHETAMINE ASPARTATE MONOHYDRATE, DEXTROAMPHETAMINE SULFATE AND AMPHETAMINE SULFATE 5; 5; 5; 5 MG/1; MG/1; MG/1; MG/1
40 CAPSULE, EXTENDED RELEASE ORAL EVERY MORNING
Qty: 60 CAPSULE | Refills: 0 | Status: SHIPPED | OUTPATIENT
Start: 2025-06-25 | End: 2025-07-25

## 2025-08-07 ENCOUNTER — OFFICE VISIT (OUTPATIENT)
Dept: NEUROLOGY | Facility: CLINIC | Age: 30
End: 2025-08-07
Payer: COMMERCIAL

## 2025-08-07 VITALS
SYSTOLIC BLOOD PRESSURE: 139 MMHG | HEIGHT: 69 IN | HEART RATE: 97 BPM | BODY MASS INDEX: 21.17 KG/M2 | WEIGHT: 142.94 LBS | RESPIRATION RATE: 20 BRPM | DIASTOLIC BLOOD PRESSURE: 92 MMHG

## 2025-08-07 DIAGNOSIS — R55 VASOVAGAL SYNCOPE: Primary | ICD-10-CM

## 2025-08-07 DIAGNOSIS — R56.9 NONEPILEPTIC EPISODE: ICD-10-CM

## 2025-08-07 DIAGNOSIS — R55 SYNCOPE AND COLLAPSE: ICD-10-CM

## 2025-08-07 PROCEDURE — 99999 PR PBB SHADOW E&M-EST. PATIENT-LVL III: CPT | Mod: PBBFAC,,, | Performed by: NURSE PRACTITIONER

## 2025-08-07 PROCEDURE — 3075F SYST BP GE 130 - 139MM HG: CPT | Mod: CPTII,S$GLB,, | Performed by: NURSE PRACTITIONER

## 2025-08-07 PROCEDURE — 4010F ACE/ARB THERAPY RXD/TAKEN: CPT | Mod: CPTII,S$GLB,, | Performed by: NURSE PRACTITIONER

## 2025-08-07 PROCEDURE — 1159F MED LIST DOCD IN RCRD: CPT | Mod: CPTII,S$GLB,, | Performed by: NURSE PRACTITIONER

## 2025-08-07 PROCEDURE — 3008F BODY MASS INDEX DOCD: CPT | Mod: CPTII,S$GLB,, | Performed by: NURSE PRACTITIONER

## 2025-08-07 PROCEDURE — 99204 OFFICE O/P NEW MOD 45 MIN: CPT | Mod: S$GLB,,, | Performed by: NURSE PRACTITIONER

## 2025-08-07 PROCEDURE — 3080F DIAST BP >= 90 MM HG: CPT | Mod: CPTII,S$GLB,, | Performed by: NURSE PRACTITIONER

## 2025-08-07 NOTE — ASSESSMENT & PLAN NOTE
History provided today most c/w vasovagal syncope  Pt reports that he has been told this in the past, but wanted to have baseline neurological evaluation given recent increase in sensitivity to triggers. Discussed condition with pt and partner. Recommended continuing to avoid triggers as able. Also discussed the importance of proper hydration to aid in recovery from episodes / potentially reduce severity of episodes. Discussed the impact of excessive caffeine on hydration status.   Considered other possible etiologies, such as epileptic events, orthostatic hypotension or PNES, but seems less likely due to lack of post ictal features / triggered nature of events, high BP and lack of compliance with anti HTN medications.   Will obtain baseline MRI of the brain w wo   Labs already ordered by PCP  We did discuss [place] state laws pertaining to driving after LOC - he v/u
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
## 2025-08-18 DIAGNOSIS — F90.0 ATTENTION DEFICIT HYPERACTIVITY DISORDER (ADHD), PREDOMINANTLY INATTENTIVE TYPE: ICD-10-CM

## 2025-08-18 RX ORDER — DEXTROAMPHETAMINE SACCHARATE, AMPHETAMINE ASPARTATE MONOHYDRATE, DEXTROAMPHETAMINE SULFATE AND AMPHETAMINE SULFATE 5; 5; 5; 5 MG/1; MG/1; MG/1; MG/1
40 CAPSULE, EXTENDED RELEASE ORAL EVERY MORNING
Qty: 60 CAPSULE | Refills: 0 | Status: SHIPPED | OUTPATIENT
Start: 2025-08-18 | End: 2025-09-17

## 2025-08-27 ENCOUNTER — HOSPITAL ENCOUNTER (OUTPATIENT)
Dept: RADIOLOGY | Facility: HOSPITAL | Age: 30
Discharge: HOME | End: 2025-08-27
Attending: NURSE PRACTITIONER
Payer: COMMERCIAL

## 2025-08-27 DIAGNOSIS — R55 SYNCOPE AND COLLAPSE: ICD-10-CM

## 2025-08-27 PROCEDURE — 70553 MRI BRAIN STEM W/O & W/DYE: CPT | Mod: 26,,, | Performed by: RADIOLOGY

## 2025-08-27 PROCEDURE — 25500020 PHARM REV CODE 255: Mod: PO | Performed by: NURSE PRACTITIONER

## 2025-08-27 PROCEDURE — 70553 MRI BRAIN STEM W/O & W/DYE: CPT | Mod: TC,PO

## 2025-08-27 PROCEDURE — A9585 GADOBUTROL INJECTION: HCPCS | Mod: PO | Performed by: NURSE PRACTITIONER

## 2025-08-27 RX ORDER — GADOBUTROL 604.72 MG/ML
6 INJECTION INTRAVENOUS
Status: COMPLETED | OUTPATIENT
Start: 2025-08-27 | End: 2025-08-27

## 2025-08-27 RX ADMIN — GADOBUTROL 6 ML: 604.72 INJECTION INTRAVENOUS at 02:08
